# Patient Record
Sex: FEMALE | Race: WHITE
[De-identification: names, ages, dates, MRNs, and addresses within clinical notes are randomized per-mention and may not be internally consistent; named-entity substitution may affect disease eponyms.]

---

## 2017-09-25 ENCOUNTER — HOSPITAL ENCOUNTER (EMERGENCY)
Dept: HOSPITAL 92 - ERS | Age: 47
Discharge: HOME | End: 2017-09-25
Payer: COMMERCIAL

## 2017-09-25 DIAGNOSIS — F17.210: ICD-10-CM

## 2017-09-25 DIAGNOSIS — D72.829: Primary | ICD-10-CM

## 2017-09-25 DIAGNOSIS — J44.9: ICD-10-CM

## 2017-09-25 DIAGNOSIS — F41.9: ICD-10-CM

## 2017-09-25 DIAGNOSIS — F32.9: ICD-10-CM

## 2017-09-25 LAB
ALP SERPL-CCNC: 71 U/L (ref 40–150)
ALT SERPL W P-5'-P-CCNC: 13 U/L (ref 8–55)
ANION GAP SERPL CALC-SCNC: 13 MMOL/L (ref 10–20)
AST SERPL-CCNC: 16 U/L (ref 5–34)
BASOPHILS # BLD AUTO: 0.1 THOU/UL (ref 0–0.2)
BASOPHILS NFR BLD AUTO: 0.7 % (ref 0–1)
BILIRUB SERPL-MCNC: 0.2 MG/DL (ref 0.2–1.2)
BUN SERPL-MCNC: 15 MG/DL (ref 7–18.7)
CALCIUM SERPL-MCNC: 9.7 MG/DL (ref 7.8–10.44)
CHLORIDE SERPL-SCNC: 104 MMOL/L (ref 98–107)
CO2 SERPL-SCNC: 23 MMOL/L (ref 22–29)
CREAT CL PREDICTED SERPL C-G-VRATE: 0 ML/MIN (ref 70–130)
EOSINOPHIL # BLD AUTO: 0.5 THOU/UL (ref 0–0.7)
EOSINOPHIL NFR BLD AUTO: 2.7 % (ref 0–10)
GLOBULIN SER CALC-MCNC: 3.8 G/DL (ref 2.4–3.5)
HCT VFR BLD CALC: 46.2 % (ref 36–47)
LYMPHOCYTES # BLD: 4.3 THOU/UL (ref 1.2–3.4)
LYMPHOCYTES NFR BLD AUTO: 21.9 % (ref 21–51)
MONOCYTES # BLD AUTO: 1.5 THOU/UL (ref 0.11–0.59)
MONOCYTES NFR BLD AUTO: 7.4 % (ref 0–10)
NEUTROPHILS # BLD AUTO: 13.3 THOU/UL (ref 1.4–6.5)
RBC # BLD AUTO: 5 MILL/UL (ref 4.2–5.4)
WBC # BLD AUTO: 19.7 THOU/UL (ref 4.8–10.8)

## 2017-09-25 PROCEDURE — 80053 COMPREHEN METABOLIC PANEL: CPT

## 2017-09-25 PROCEDURE — 81003 URINALYSIS AUTO W/O SCOPE: CPT

## 2017-09-25 PROCEDURE — 96375 TX/PRO/DX INJ NEW DRUG ADDON: CPT

## 2017-09-25 PROCEDURE — 36415 COLL VENOUS BLD VENIPUNCTURE: CPT

## 2017-09-25 PROCEDURE — 96361 HYDRATE IV INFUSION ADD-ON: CPT

## 2017-09-25 PROCEDURE — 74177 CT ABD & PELVIS W/CONTRAST: CPT

## 2017-09-25 PROCEDURE — 96374 THER/PROPH/DIAG INJ IV PUSH: CPT

## 2017-09-25 PROCEDURE — 85025 COMPLETE CBC W/AUTO DIFF WBC: CPT

## 2017-10-16 ENCOUNTER — HOSPITAL ENCOUNTER (EMERGENCY)
Dept: HOSPITAL 92 - ERS | Age: 47
Discharge: HOME | End: 2017-10-16
Payer: COMMERCIAL

## 2017-10-16 DIAGNOSIS — R07.9: ICD-10-CM

## 2017-10-16 DIAGNOSIS — F32.9: ICD-10-CM

## 2017-10-16 DIAGNOSIS — Z79.899: ICD-10-CM

## 2017-10-16 DIAGNOSIS — J38.3: Primary | ICD-10-CM

## 2017-10-16 DIAGNOSIS — F41.9: ICD-10-CM

## 2017-10-16 DIAGNOSIS — J44.9: ICD-10-CM

## 2017-10-16 DIAGNOSIS — F17.210: ICD-10-CM

## 2017-10-16 DIAGNOSIS — F43.9: ICD-10-CM

## 2017-10-16 LAB
ALP SERPL-CCNC: 78 U/L (ref 40–150)
ALT SERPL W P-5'-P-CCNC: 11 U/L (ref 8–55)
ANION GAP SERPL CALC-SCNC: 13 MMOL/L (ref 10–20)
APAP SERPL-MCNC: (no result) MCG/ML (ref 10–30)
AST SERPL-CCNC: 17 U/L (ref 5–34)
BASOPHILS # BLD AUTO: 0.1 THOU/UL (ref 0–0.2)
BASOPHILS NFR BLD AUTO: 1.1 % (ref 0–1)
BILIRUB SERPL-MCNC: 0.7 MG/DL (ref 0.2–1.2)
BUN SERPL-MCNC: 15 MG/DL (ref 7–18.7)
CALCIUM SERPL-MCNC: 9.6 MG/DL (ref 7.8–10.44)
CHLORIDE SERPL-SCNC: 104 MMOL/L (ref 98–107)
CO2 SERPL-SCNC: 26 MMOL/L (ref 22–29)
CREAT CL PREDICTED SERPL C-G-VRATE: 0 ML/MIN (ref 70–130)
EOSINOPHIL # BLD AUTO: 0.6 THOU/UL (ref 0–0.7)
EOSINOPHIL NFR BLD AUTO: 5.5 % (ref 0–10)
GLOBULIN SER CALC-MCNC: 4 G/DL (ref 2.4–3.5)
HCT VFR BLD CALC: 49 % (ref 36–47)
LYMPHOCYTES # BLD: 4.7 THOU/UL (ref 1.2–3.4)
LYMPHOCYTES NFR BLD AUTO: 41.4 % (ref 21–51)
MODIFIED ALLEN'S TEST: POSITIVE
MONOCYTES # BLD AUTO: 1.2 THOU/UL (ref 0.11–0.59)
MONOCYTES NFR BLD AUTO: 10.6 % (ref 0–10)
NEUTROPHILS # BLD AUTO: 4.7 THOU/UL (ref 1.4–6.5)
RBC # BLD AUTO: 5.26 MILL/UL (ref 4.2–5.4)
SALICYLATES SERPL-MCNC: (no result) MG/DL (ref 15–30)
SODIUM SERPL-SCNC: 143 MMOL/L (ref 135–148)
VENT: NO
WBC # BLD AUTO: 11.4 THOU/UL (ref 4.8–10.8)

## 2017-10-16 PROCEDURE — 81003 URINALYSIS AUTO W/O SCOPE: CPT

## 2017-10-16 PROCEDURE — 93005 ELECTROCARDIOGRAM TRACING: CPT

## 2017-10-16 PROCEDURE — 96375 TX/PRO/DX INJ NEW DRUG ADDON: CPT

## 2017-10-16 PROCEDURE — 80306 DRUG TEST PRSMV INSTRMNT: CPT

## 2017-10-16 PROCEDURE — 96361 HYDRATE IV INFUSION ADD-ON: CPT

## 2017-10-16 PROCEDURE — 80053 COMPREHEN METABOLIC PANEL: CPT

## 2017-10-16 PROCEDURE — 94640 AIRWAY INHALATION TREATMENT: CPT

## 2017-10-16 PROCEDURE — 80307 DRUG TEST PRSMV CHEM ANLYZR: CPT

## 2017-10-16 PROCEDURE — 71010: CPT

## 2017-10-16 PROCEDURE — 85025 COMPLETE CBC W/AUTO DIFF WBC: CPT

## 2017-10-16 PROCEDURE — 82805 BLOOD GASES W/O2 SATURATION: CPT

## 2017-10-16 PROCEDURE — 84443 ASSAY THYROID STIM HORMONE: CPT

## 2017-10-16 PROCEDURE — 96374 THER/PROPH/DIAG INJ IV PUSH: CPT

## 2017-10-21 ENCOUNTER — HOSPITAL ENCOUNTER (INPATIENT)
Dept: HOSPITAL 92 - ERS | Age: 47
LOS: 4 days | Discharge: HOME | DRG: 208 | End: 2017-10-25
Attending: INTERNAL MEDICINE | Admitting: INTERNAL MEDICINE
Payer: COMMERCIAL

## 2017-10-21 VITALS — BODY MASS INDEX: 24.1 KG/M2

## 2017-10-21 DIAGNOSIS — F17.210: ICD-10-CM

## 2017-10-21 DIAGNOSIS — J44.1: Primary | ICD-10-CM

## 2017-10-21 DIAGNOSIS — F41.0: ICD-10-CM

## 2017-10-21 DIAGNOSIS — J96.21: ICD-10-CM

## 2017-10-21 DIAGNOSIS — J38.00: ICD-10-CM

## 2017-10-21 DIAGNOSIS — G25.81: ICD-10-CM

## 2017-10-21 DIAGNOSIS — F43.9: ICD-10-CM

## 2017-10-21 DIAGNOSIS — Z88.0: ICD-10-CM

## 2017-10-21 DIAGNOSIS — M79.7: ICD-10-CM

## 2017-10-21 DIAGNOSIS — Z88.1: ICD-10-CM

## 2017-10-21 DIAGNOSIS — J45.901: ICD-10-CM

## 2017-10-21 DIAGNOSIS — F32.9: ICD-10-CM

## 2017-10-21 DIAGNOSIS — F41.9: ICD-10-CM

## 2017-10-21 LAB
ALP SERPL-CCNC: 73 U/L (ref 40–150)
ALT SERPL W P-5'-P-CCNC: 24 U/L (ref 8–55)
ANION GAP SERPL CALC-SCNC: 11 MMOL/L (ref 10–20)
AST SERPL-CCNC: 29 U/L (ref 5–34)
BASOPHILS # BLD AUTO: 0.1 THOU/UL (ref 0–0.2)
BASOPHILS NFR BLD AUTO: 0.9 % (ref 0–1)
BILIRUB SERPL-MCNC: 0.3 MG/DL (ref 0.2–1.2)
BUN SERPL-MCNC: 20 MG/DL (ref 7–18.7)
CALCIUM SERPL-MCNC: 9.1 MG/DL (ref 7.8–10.44)
CHLORIDE SERPL-SCNC: 104 MMOL/L (ref 98–107)
CO2 SERPL-SCNC: 26 MMOL/L (ref 22–29)
CREAT CL PREDICTED SERPL C-G-VRATE: 0 ML/MIN (ref 70–130)
EOSINOPHIL # BLD AUTO: 0.1 THOU/UL (ref 0–0.7)
EOSINOPHIL NFR BLD AUTO: 0.9 % (ref 0–10)
GLOBULIN SER CALC-MCNC: 3.4 G/DL (ref 2.4–3.5)
HCT VFR BLD CALC: 45 % (ref 36–47)
LACTATE SERPL-SCNC: 0.8 MMOL/L (ref 0.5–2.2)
LYMPHOCYTES # BLD: 4.2 THOU/UL (ref 1.2–3.4)
LYMPHOCYTES NFR BLD AUTO: 31 % (ref 21–51)
MAGNESIUM SERPL-MCNC: 1.9 MG/DL (ref 1.6–2.6)
MECHANICAL TIDAL VOLUME: 400 ML
MECHANICAL TIDAL VOLUME: 400 ML
MODIFIED ALLEN'S TEST: (no result)
MODIFIED ALLEN'S TEST: POSITIVE
MONOCYTES # BLD AUTO: 0.9 THOU/UL (ref 0.11–0.59)
MONOCYTES NFR BLD AUTO: 6.7 % (ref 0–10)
NEUTROPHILS # BLD AUTO: 8.1 THOU/UL (ref 1.4–6.5)
RBC # BLD AUTO: 4.83 MILL/UL (ref 4.2–5.4)
SODIUM SERPL-SCNC: 138 MMOL/L (ref 135–148)
SODIUM SERPL-SCNC: 140 MMOL/L (ref 135–148)
TROPONIN I SERPL DL<=0.01 NG/ML-MCNC: (no result) NG/ML (ref ?–0.03)
VENT: YES
VENT: YES
WBC # BLD AUTO: 13.4 THOU/UL (ref 4.8–10.8)

## 2017-10-21 PROCEDURE — 83735 ASSAY OF MAGNESIUM: CPT

## 2017-10-21 PROCEDURE — 85027 COMPLETE CBC AUTOMATED: CPT

## 2017-10-21 PROCEDURE — 85379 FIBRIN DEGRADATION QUANT: CPT

## 2017-10-21 PROCEDURE — 81003 URINALYSIS AUTO W/O SCOPE: CPT

## 2017-10-21 PROCEDURE — 85025 COMPLETE CBC W/AUTO DIFF WBC: CPT

## 2017-10-21 PROCEDURE — 94727 GAS DIL/WSHOT DETER LNG VOL: CPT

## 2017-10-21 PROCEDURE — 80053 COMPREHEN METABOLIC PANEL: CPT

## 2017-10-21 PROCEDURE — 85007 BL SMEAR W/DIFF WBC COUNT: CPT

## 2017-10-21 PROCEDURE — 83605 ASSAY OF LACTIC ACID: CPT

## 2017-10-21 PROCEDURE — 5A1935Z RESPIRATORY VENTILATION, LESS THAN 24 CONSECUTIVE HOURS: ICD-10-PCS | Performed by: INTERNAL MEDICINE

## 2017-10-21 PROCEDURE — 94640 AIRWAY INHALATION TREATMENT: CPT

## 2017-10-21 PROCEDURE — 82553 CREATINE MB FRACTION: CPT

## 2017-10-21 PROCEDURE — 99292 CRITICAL CARE ADDL 30 MIN: CPT

## 2017-10-21 PROCEDURE — 71010: CPT

## 2017-10-21 PROCEDURE — 94060 EVALUATION OF WHEEZING: CPT

## 2017-10-21 PROCEDURE — 80048 BASIC METABOLIC PNL TOTAL CA: CPT

## 2017-10-21 PROCEDURE — 84484 ASSAY OF TROPONIN QUANT: CPT

## 2017-10-21 PROCEDURE — S0028 INJECTION, FAMOTIDINE, 20 MG: HCPCS

## 2017-10-21 PROCEDURE — 51702 INSERT TEMP BLADDER CATH: CPT

## 2017-10-21 PROCEDURE — 84702 CHORIONIC GONADOTROPIN TEST: CPT

## 2017-10-21 PROCEDURE — 94002 VENT MGMT INPAT INIT DAY: CPT

## 2017-10-21 PROCEDURE — 96375 TX/PRO/DX INJ NEW DRUG ADDON: CPT

## 2017-10-21 PROCEDURE — 36415 COLL VENOUS BLD VENIPUNCTURE: CPT

## 2017-10-21 PROCEDURE — 70450 CT HEAD/BRAIN W/O DYE: CPT

## 2017-10-21 PROCEDURE — 93005 ELECTROCARDIOGRAM TRACING: CPT

## 2017-10-21 PROCEDURE — 96365 THER/PROPH/DIAG IV INF INIT: CPT

## 2017-10-21 PROCEDURE — A4216 STERILE WATER/SALINE, 10 ML: HCPCS

## 2017-10-21 PROCEDURE — 72125 CT NECK SPINE W/O DYE: CPT

## 2017-10-21 PROCEDURE — 82805 BLOOD GASES W/O2 SATURATION: CPT

## 2017-10-21 RX ADMIN — FAMOTIDINE SCH MG: 10 INJECTION, SOLUTION INTRAVENOUS at 20:04

## 2017-10-21 RX ADMIN — FAMOTIDINE SCH MG: 10 INJECTION, SOLUTION INTRAVENOUS at 08:44

## 2017-10-22 LAB
ANION GAP SERPL CALC-SCNC: 11 MMOL/L (ref 10–20)
BUN SERPL-MCNC: 13 MG/DL (ref 7–18.7)
CALCIUM SERPL-MCNC: 9.4 MG/DL (ref 7.8–10.44)
CHLORIDE SERPL-SCNC: 102 MMOL/L (ref 98–107)
CO2 SERPL-SCNC: 29 MMOL/L (ref 22–29)
CREAT CL PREDICTED SERPL C-G-VRATE: 105 ML/MIN (ref 70–130)
HCT VFR BLD CALC: 39 % (ref 36–47)
RBC # BLD AUTO: 4.13 MILL/UL (ref 4.2–5.4)
WBC # BLD AUTO: 15.9 THOU/UL (ref 4.8–10.8)

## 2017-10-22 RX ADMIN — FAMOTIDINE SCH MG: 10 INJECTION, SOLUTION INTRAVENOUS at 08:39

## 2017-10-23 LAB
ANION GAP SERPL CALC-SCNC: 8 MMOL/L (ref 10–20)
BUN SERPL-MCNC: 19 MG/DL (ref 7–18.7)
CALCIUM SERPL-MCNC: 8.9 MG/DL (ref 7.8–10.44)
CHLORIDE SERPL-SCNC: 103 MMOL/L (ref 98–107)
CO2 SERPL-SCNC: 32 MMOL/L (ref 22–29)
CREAT CL PREDICTED SERPL C-G-VRATE: 101 ML/MIN (ref 70–130)
HCT VFR BLD CALC: 40.5 % (ref 36–47)
RBC # BLD AUTO: 4.29 MILL/UL (ref 4.2–5.4)
WBC # BLD AUTO: 15.5 THOU/UL (ref 4.8–10.8)

## 2017-10-23 RX ADMIN — Medication SCH ML: at 21:03

## 2017-10-24 LAB
ANION GAP SERPL CALC-SCNC: 11 MMOL/L (ref 10–20)
BUN SERPL-MCNC: 15 MG/DL (ref 7–18.7)
CALCIUM SERPL-MCNC: 8.6 MG/DL (ref 7.8–10.44)
CHLORIDE SERPL-SCNC: 103 MMOL/L (ref 98–107)
CO2 SERPL-SCNC: 27 MMOL/L (ref 22–29)
CREAT CL PREDICTED SERPL C-G-VRATE: 117 ML/MIN (ref 70–130)
HCT VFR BLD CALC: 42.2 % (ref 36–47)
RBC # BLD AUTO: 4.58 MILL/UL (ref 4.2–5.4)
WBC # BLD AUTO: 16.2 THOU/UL (ref 4.8–10.8)

## 2017-10-24 RX ADMIN — Medication SCH ML: at 08:47

## 2017-10-24 RX ADMIN — Medication SCH: at 21:45

## 2017-10-24 RX ADMIN — DOCUSATE SODIUM 50 MG AND SENNOSIDES 8.6 MG SCH: 8.6; 5 TABLET, FILM COATED ORAL at 21:19

## 2017-10-24 RX ADMIN — Medication SCH ML: at 21:46

## 2017-10-25 VITALS — DIASTOLIC BLOOD PRESSURE: 72 MMHG | TEMPERATURE: 98.6 F | SYSTOLIC BLOOD PRESSURE: 129 MMHG

## 2017-10-25 RX ADMIN — Medication SCH: at 09:10

## 2017-10-25 RX ADMIN — DOCUSATE SODIUM 50 MG AND SENNOSIDES 8.6 MG SCH: 8.6; 5 TABLET, FILM COATED ORAL at 09:10

## 2017-12-05 ENCOUNTER — HOSPITAL ENCOUNTER (OUTPATIENT)
Dept: HOSPITAL 92 - CT | Age: 47
Discharge: HOME | End: 2017-12-05
Attending: INTERNAL MEDICINE
Payer: COMMERCIAL

## 2017-12-05 DIAGNOSIS — R91.1: Primary | ICD-10-CM

## 2017-12-05 PROCEDURE — 71260 CT THORAX DX C+: CPT

## 2017-12-13 ENCOUNTER — HOSPITAL ENCOUNTER (OUTPATIENT)
Dept: HOSPITAL 92 - SCSER | Age: 47
Setting detail: OBSERVATION
Discharge: LEFT BEFORE DISCHARGE | End: 2017-12-13
Attending: FAMILY MEDICINE | Admitting: FAMILY MEDICINE
Payer: COMMERCIAL

## 2017-12-13 VITALS — DIASTOLIC BLOOD PRESSURE: 75 MMHG | SYSTOLIC BLOOD PRESSURE: 121 MMHG | TEMPERATURE: 99.2 F

## 2017-12-13 VITALS — BODY MASS INDEX: 25.5 KG/M2

## 2017-12-13 DIAGNOSIS — G24.09: Primary | ICD-10-CM

## 2017-12-13 DIAGNOSIS — T43.595A: ICD-10-CM

## 2017-12-13 DIAGNOSIS — G25.81: ICD-10-CM

## 2017-12-13 DIAGNOSIS — F17.210: ICD-10-CM

## 2017-12-13 DIAGNOSIS — M62.838: ICD-10-CM

## 2017-12-13 DIAGNOSIS — J44.9: ICD-10-CM

## 2017-12-13 DIAGNOSIS — Z88.8: ICD-10-CM

## 2017-12-13 DIAGNOSIS — Z79.51: ICD-10-CM

## 2017-12-13 DIAGNOSIS — M54.12: ICD-10-CM

## 2017-12-13 DIAGNOSIS — Z90.49: ICD-10-CM

## 2017-12-13 DIAGNOSIS — J45.909: ICD-10-CM

## 2017-12-13 DIAGNOSIS — Z88.0: ICD-10-CM

## 2017-12-13 DIAGNOSIS — Z88.1: ICD-10-CM

## 2017-12-13 DIAGNOSIS — Z79.890: ICD-10-CM

## 2017-12-13 DIAGNOSIS — Z88.5: ICD-10-CM

## 2017-12-13 DIAGNOSIS — Z79.899: ICD-10-CM

## 2017-12-13 DIAGNOSIS — F41.8: ICD-10-CM

## 2017-12-13 DIAGNOSIS — Z98.890: ICD-10-CM

## 2017-12-13 DIAGNOSIS — Z90.710: ICD-10-CM

## 2017-12-13 DIAGNOSIS — M79.7: ICD-10-CM

## 2017-12-13 DIAGNOSIS — Z90.89: ICD-10-CM

## 2017-12-13 DIAGNOSIS — D72.829: ICD-10-CM

## 2017-12-13 LAB
ALP SERPL-CCNC: 63 U/L (ref 40–150)
ALT SERPL W P-5'-P-CCNC: 14 U/L (ref 8–55)
ANION GAP SERPL CALC-SCNC: 15 MMOL/L (ref 10–20)
AST SERPL-CCNC: 18 U/L (ref 5–34)
BASOPHILS # BLD AUTO: 0.2 THOU/UL (ref 0–0.2)
BASOPHILS NFR BLD AUTO: 1.5 % (ref 0–1)
BILIRUB SERPL-MCNC: 0.3 MG/DL (ref 0.2–1.2)
BUN SERPL-MCNC: 15 MG/DL (ref 7–18.7)
CALCIUM SERPL-MCNC: 9.7 MG/DL (ref 7.8–10.44)
CHLORIDE SERPL-SCNC: 106 MMOL/L (ref 98–107)
CO2 SERPL-SCNC: 23 MMOL/L (ref 22–29)
CREAT CL PREDICTED SERPL C-G-VRATE: 0 ML/MIN (ref 70–130)
EOSINOPHIL # BLD AUTO: 0.4 THOU/UL (ref 0–0.7)
EOSINOPHIL NFR BLD AUTO: 3.1 % (ref 0–10)
GLOBULIN SER CALC-MCNC: 3.5 G/DL (ref 2.4–3.5)
HCT VFR BLD CALC: 48.2 % (ref 36–47)
LYMPHOCYTES # BLD: 2.8 THOU/UL (ref 1.2–3.4)
LYMPHOCYTES NFR BLD AUTO: 23.8 % (ref 21–51)
MONOCYTES # BLD AUTO: 1 THOU/UL (ref 0.11–0.59)
MONOCYTES NFR BLD AUTO: 8.9 % (ref 0–10)
NEUTROPHILS # BLD AUTO: 7.3 THOU/UL (ref 1.4–6.5)
RBC # BLD AUTO: 5.32 MILL/UL (ref 4.2–5.4)
TROPONIN I SERPL DL<=0.01 NG/ML-MCNC: (no result) NG/ML (ref ?–0.03)
TROPONIN I SERPL DL<=0.01 NG/ML-MCNC: (no result) NG/ML (ref ?–0.03)
TROPONIN I SERPL DL<=0.01 NG/ML-MCNC: 0.01 NG/ML (ref ?–0.03)
WBC # BLD AUTO: 11.6 THOU/UL (ref 4.8–10.8)

## 2017-12-13 PROCEDURE — 80053 COMPREHEN METABOLIC PANEL: CPT

## 2017-12-13 PROCEDURE — 82553 CREATINE MB FRACTION: CPT

## 2017-12-13 PROCEDURE — 94640 AIRWAY INHALATION TREATMENT: CPT

## 2017-12-13 PROCEDURE — 81003 URINALYSIS AUTO W/O SCOPE: CPT

## 2017-12-13 PROCEDURE — G0378 HOSPITAL OBSERVATION PER HR: HCPCS

## 2017-12-13 PROCEDURE — 80306 DRUG TEST PRSMV INSTRMNT: CPT

## 2017-12-13 PROCEDURE — 96374 THER/PROPH/DIAG INJ IV PUSH: CPT

## 2017-12-13 PROCEDURE — 70450 CT HEAD/BRAIN W/O DYE: CPT

## 2017-12-13 PROCEDURE — 93005 ELECTROCARDIOGRAM TRACING: CPT

## 2017-12-13 PROCEDURE — 36415 COLL VENOUS BLD VENIPUNCTURE: CPT

## 2017-12-13 PROCEDURE — 85025 COMPLETE CBC W/AUTO DIFF WBC: CPT

## 2017-12-13 PROCEDURE — 84484 ASSAY OF TROPONIN QUANT: CPT

## 2017-12-22 ENCOUNTER — HOSPITAL ENCOUNTER (OUTPATIENT)
Dept: HOSPITAL 92 - ERS | Age: 47
Setting detail: OBSERVATION
LOS: 2 days | Discharge: HOME | End: 2017-12-24
Attending: INTERNAL MEDICINE | Admitting: INTERNAL MEDICINE
Payer: COMMERCIAL

## 2017-12-22 DIAGNOSIS — R06.03: ICD-10-CM

## 2017-12-22 DIAGNOSIS — J44.1: Primary | ICD-10-CM

## 2017-12-22 DIAGNOSIS — Z88.1: ICD-10-CM

## 2017-12-22 DIAGNOSIS — G25.81: ICD-10-CM

## 2017-12-22 DIAGNOSIS — Z88.0: ICD-10-CM

## 2017-12-22 DIAGNOSIS — Z88.8: ICD-10-CM

## 2017-12-22 DIAGNOSIS — F32.9: ICD-10-CM

## 2017-12-22 DIAGNOSIS — Z88.5: ICD-10-CM

## 2017-12-22 DIAGNOSIS — J45.909: ICD-10-CM

## 2017-12-22 DIAGNOSIS — M79.7: ICD-10-CM

## 2017-12-22 DIAGNOSIS — F41.9: ICD-10-CM

## 2017-12-22 DIAGNOSIS — F17.200: ICD-10-CM

## 2017-12-22 LAB
ALP SERPL-CCNC: 65 U/L (ref 40–150)
ALT SERPL W P-5'-P-CCNC: 15 U/L (ref 8–55)
ANION GAP SERPL CALC-SCNC: 14 MMOL/L (ref 10–20)
AST SERPL-CCNC: 18 U/L (ref 5–34)
BASOPHILS # BLD AUTO: 0.1 THOU/UL (ref 0–0.2)
BASOPHILS NFR BLD AUTO: 0.8 % (ref 0–1)
BILIRUB SERPL-MCNC: 0.2 MG/DL (ref 0.2–1.2)
BUN SERPL-MCNC: 19 MG/DL (ref 7–18.7)
CALCIUM SERPL-MCNC: 9.7 MG/DL (ref 7.8–10.44)
CHLORIDE SERPL-SCNC: 104 MMOL/L (ref 98–107)
CK SERPL-CCNC: 165 U/L (ref 29–168)
CO2 SERPL-SCNC: 26 MMOL/L (ref 22–29)
CREAT CL PREDICTED SERPL C-G-VRATE: 0 ML/MIN (ref 70–130)
EOSINOPHIL # BLD AUTO: 0.1 THOU/UL (ref 0–0.7)
EOSINOPHIL NFR BLD AUTO: 0.8 % (ref 0–10)
GLOBULIN SER CALC-MCNC: 3.4 G/DL (ref 2.4–3.5)
HCT VFR BLD CALC: 42.1 % (ref 36–47)
LYMPHOCYTES # BLD: 3.4 THOU/UL (ref 1.2–3.4)
LYMPHOCYTES NFR BLD AUTO: 22.2 % (ref 21–51)
MODIFIED ALLEN'S TEST: POSITIVE
MONOCYTES # BLD AUTO: 1.2 THOU/UL (ref 0.11–0.59)
MONOCYTES NFR BLD AUTO: 7.6 % (ref 0–10)
NEUTROPHILS # BLD AUTO: 10.5 THOU/UL (ref 1.4–6.5)
RBC # BLD AUTO: 4.6 MILL/UL (ref 4.2–5.4)
SODIUM SERPL-SCNC: 140 MMOL/L (ref 135–148)
TROPONIN I SERPL DL<=0.01 NG/ML-MCNC: (no result) NG/ML (ref ?–0.03)
VENT: NO
WBC # BLD AUTO: 15.3 THOU/UL (ref 4.8–10.8)

## 2017-12-22 PROCEDURE — 94640 AIRWAY INHALATION TREATMENT: CPT

## 2017-12-22 PROCEDURE — 96366 THER/PROPH/DIAG IV INF ADDON: CPT

## 2017-12-22 PROCEDURE — 87633 RESP VIRUS 12-25 TARGETS: CPT

## 2017-12-22 PROCEDURE — 82805 BLOOD GASES W/O2 SATURATION: CPT

## 2017-12-22 PROCEDURE — 99406 BEHAV CHNG SMOKING 3-10 MIN: CPT

## 2017-12-22 PROCEDURE — 83880 ASSAY OF NATRIURETIC PEPTIDE: CPT

## 2017-12-22 PROCEDURE — 80053 COMPREHEN METABOLIC PANEL: CPT

## 2017-12-22 PROCEDURE — 96367 TX/PROPH/DG ADDL SEQ IV INF: CPT

## 2017-12-22 PROCEDURE — 85025 COMPLETE CBC W/AUTO DIFF WBC: CPT

## 2017-12-22 PROCEDURE — 71010: CPT

## 2017-12-22 PROCEDURE — 93005 ELECTROCARDIOGRAM TRACING: CPT

## 2017-12-22 PROCEDURE — 87798 DETECT AGENT NOS DNA AMP: CPT

## 2017-12-22 PROCEDURE — G0378 HOSPITAL OBSERVATION PER HR: HCPCS

## 2017-12-22 PROCEDURE — 82553 CREATINE MB FRACTION: CPT

## 2017-12-22 PROCEDURE — 96375 TX/PRO/DX INJ NEW DRUG ADDON: CPT

## 2017-12-22 PROCEDURE — 96365 THER/PROPH/DIAG IV INF INIT: CPT

## 2017-12-22 PROCEDURE — 84484 ASSAY OF TROPONIN QUANT: CPT

## 2017-12-22 PROCEDURE — A4216 STERILE WATER/SALINE, 10 ML: HCPCS

## 2017-12-22 PROCEDURE — 96376 TX/PRO/DX INJ SAME DRUG ADON: CPT

## 2017-12-23 RX ADMIN — Medication SCH ML: at 20:21

## 2017-12-24 VITALS — TEMPERATURE: 98.4 F | DIASTOLIC BLOOD PRESSURE: 69 MMHG | SYSTOLIC BLOOD PRESSURE: 129 MMHG

## 2017-12-24 RX ADMIN — Medication SCH ML: at 09:02

## 2018-01-15 ENCOUNTER — HOSPITAL ENCOUNTER (INPATIENT)
Dept: HOSPITAL 92 - ERS | Age: 48
LOS: 4 days | Discharge: HOME | DRG: 208 | End: 2018-01-19
Attending: INTERNAL MEDICINE | Admitting: INTERNAL MEDICINE
Payer: COMMERCIAL

## 2018-01-15 VITALS — BODY MASS INDEX: 26.4 KG/M2

## 2018-01-15 DIAGNOSIS — J44.1: ICD-10-CM

## 2018-01-15 DIAGNOSIS — F15.10: ICD-10-CM

## 2018-01-15 DIAGNOSIS — Z88.1: ICD-10-CM

## 2018-01-15 DIAGNOSIS — J96.02: ICD-10-CM

## 2018-01-15 DIAGNOSIS — Z88.8: ICD-10-CM

## 2018-01-15 DIAGNOSIS — M79.7: ICD-10-CM

## 2018-01-15 DIAGNOSIS — J38.3: ICD-10-CM

## 2018-01-15 DIAGNOSIS — J45.901: ICD-10-CM

## 2018-01-15 DIAGNOSIS — Z88.5: ICD-10-CM

## 2018-01-15 DIAGNOSIS — F17.210: ICD-10-CM

## 2018-01-15 DIAGNOSIS — J96.01: Primary | ICD-10-CM

## 2018-01-15 DIAGNOSIS — Z88.0: ICD-10-CM

## 2018-01-15 LAB
ANALYZER IN CARDIO: (no result)
ANION GAP SERPL CALC-SCNC: 15 MMOL/L (ref 10–20)
APTT PPP: 31.2 SEC (ref 22.9–36.1)
BASE EXCESS STD BLDA CALC-SCNC: -2.3 MEQ/L
BASOPHILS # BLD AUTO: 0.1 THOU/UL (ref 0–0.2)
BASOPHILS NFR BLD AUTO: 0.7 % (ref 0–1)
BUN SERPL-MCNC: 17 MG/DL (ref 7–18.7)
CA-I BLDA-SCNC: 1.2 MMOL/L (ref 1.12–1.3)
CALCIUM SERPL-MCNC: 9.8 MG/DL (ref 7.8–10.44)
CHLORIDE SERPL-SCNC: 104 MMOL/L (ref 98–107)
CK MB SERPL-MCNC: 2.5 NG/ML (ref 0–6.6)
CK SERPL-CCNC: 172 U/L (ref 29–168)
CO2 SERPL-SCNC: 24 MMOL/L (ref 22–29)
CREAT CL PREDICTED SERPL C-G-VRATE: 0 ML/MIN (ref 70–130)
CRYSTAL-AUWI FLAG: 0.4 (ref 0–15)
DRUG SCREEN CUTOFF: (no result)
EOSINOPHIL # BLD AUTO: 0.4 THOU/UL (ref 0–0.7)
EOSINOPHIL NFR BLD AUTO: 3 % (ref 0–10)
GLUCOSE SERPL-MCNC: 84 MG/DL (ref 70–105)
HCO3 BLDA-SCNC: 24.8 MEQ/L (ref 22–26)
HCT VFR BLDA CALC: 44 % (ref 36–47)
HEV IGM SER QL: 1.8 (ref 0–7.99)
HGB BLD-MCNC: 15.5 G/DL (ref 12–16)
HGB BLDA-MCNC: 14 G/DL (ref 12–16)
HYALINE CASTS #/AREA URNS LPF: (no result) LPF
INR PPP: 1
LIPASE SERPL-CCNC: 91 U/L (ref 8–78)
LYMPHOCYTES # BLD: 3.8 THOU/UL (ref 1.2–3.4)
LYMPHOCYTES NFR BLD AUTO: 27.9 % (ref 21–51)
MAGNESIUM SERPL-MCNC: 2.5 MG/DL (ref 1.6–2.6)
MCH RBC QN AUTO: 30.1 PG (ref 27–31)
MCV RBC AUTO: 92.4 FL (ref 81–99)
MEDTOX CONTROL LINE VALID?: (no result)
MEDTOX READER #: (no result)
MONOCYTES # BLD AUTO: 1 THOU/UL (ref 0.11–0.59)
MONOCYTES NFR BLD AUTO: 7.6 % (ref 0–10)
NEUTROPHILS # BLD AUTO: 8.2 THOU/UL (ref 1.4–6.5)
NEUTROPHILS NFR BLD AUTO: 60.8 % (ref 42–75)
O2 A-A PPRESDIFF RESPIRATORY: 166.03 MM[HG] (ref 0–20)
PATHC CAST-AUWI FLAG: 1.49 (ref 0–2.49)
PCO2 BLDA: 51.9 MMHG (ref 35–45)
PH BLDA: 7.3 [PH] (ref 7.35–7.45)
PLATELET # BLD AUTO: 256 THOU/UL (ref 130–400)
PO2 BLDA: 123.1 MMHG (ref 80–100)
POTASSIUM SERPL-SCNC: 4.5 MMOL/L (ref 3.5–5.1)
PREGS CONTROL BACKGROUND?: (no result)
PREGS CONTROL BAR APPEAR?: YES
PROT UR STRIP.AUTO-MCNC: 30 MG/DL
PROTHROMBIN TIME: 13 SEC (ref 12–14.7)
RBC # BLD AUTO: 5.14 MILL/UL (ref 4.2–5.4)
RBC UR QL AUTO: (no result) HPF (ref 0–3)
SODIUM SERPL-SCNC: 138 MMOL/L (ref 136–145)
SP GR UR STRIP: 1.02 (ref 1–1.04)
SPECIMEN DRAWN FROM PATIENT: (no result)
SPERM-AUWI FLAG: 0 (ref 0–9.9)
TROPONIN I SERPL DL<=0.01 NG/ML-MCNC: 0.02 NG/ML (ref ?–0.03)
WBC # BLD AUTO: 13.5 THOU/UL (ref 4.8–10.8)
WBC UR QL AUTO: (no result) HPF (ref 0–3)
YEAST-AUWI FLAG: 0 (ref 0–25)

## 2018-01-15 PROCEDURE — 82550 ASSAY OF CK (CPK): CPT

## 2018-01-15 PROCEDURE — 87186 SC STD MICRODIL/AGAR DIL: CPT

## 2018-01-15 PROCEDURE — 85730 THROMBOPLASTIN TIME PARTIAL: CPT

## 2018-01-15 PROCEDURE — 85025 COMPLETE CBC W/AUTO DIFF WBC: CPT

## 2018-01-15 PROCEDURE — 80048 BASIC METABOLIC PNL TOTAL CA: CPT

## 2018-01-15 PROCEDURE — 96375 TX/PRO/DX INJ NEW DRUG ADDON: CPT

## 2018-01-15 PROCEDURE — 96365 THER/PROPH/DIAG IV INF INIT: CPT

## 2018-01-15 PROCEDURE — S0028 INJECTION, FAMOTIDINE, 20 MG: HCPCS

## 2018-01-15 PROCEDURE — A4216 STERILE WATER/SALINE, 10 ML: HCPCS

## 2018-01-15 PROCEDURE — 51702 INSERT TEMP BLADDER CATH: CPT

## 2018-01-15 PROCEDURE — 94002 VENT MGMT INPAT INIT DAY: CPT

## 2018-01-15 PROCEDURE — 94640 AIRWAY INHALATION TREATMENT: CPT

## 2018-01-15 PROCEDURE — 81003 URINALYSIS AUTO W/O SCOPE: CPT

## 2018-01-15 PROCEDURE — 84703 CHORIONIC GONADOTROPIN ASSAY: CPT

## 2018-01-15 PROCEDURE — 83690 ASSAY OF LIPASE: CPT

## 2018-01-15 PROCEDURE — 96376 TX/PRO/DX INJ SAME DRUG ADON: CPT

## 2018-01-15 PROCEDURE — 31500 INSERT EMERGENCY AIRWAY: CPT

## 2018-01-15 PROCEDURE — 87205 SMEAR GRAM STAIN: CPT

## 2018-01-15 PROCEDURE — 80306 DRUG TEST PRSMV INSTRMNT: CPT

## 2018-01-15 PROCEDURE — 84484 ASSAY OF TROPONIN QUANT: CPT

## 2018-01-15 PROCEDURE — 96366 THER/PROPH/DIAG IV INF ADDON: CPT

## 2018-01-15 PROCEDURE — 82805 BLOOD GASES W/O2 SATURATION: CPT

## 2018-01-15 PROCEDURE — 87070 CULTURE OTHR SPECIMN AEROBIC: CPT

## 2018-01-15 PROCEDURE — 71045 X-RAY EXAM CHEST 1 VIEW: CPT

## 2018-01-15 PROCEDURE — 82553 CREATINE MB FRACTION: CPT

## 2018-01-15 PROCEDURE — 94003 VENT MGMT INPAT SUBQ DAY: CPT

## 2018-01-15 PROCEDURE — 36415 COLL VENOUS BLD VENIPUNCTURE: CPT

## 2018-01-15 PROCEDURE — 93005 ELECTROCARDIOGRAM TRACING: CPT

## 2018-01-15 PROCEDURE — 0BH17EZ INSERTION OF ENDOTRACHEAL AIRWAY INTO TRACHEA, VIA NATURAL OR ARTIFICIAL OPENING: ICD-10-PCS | Performed by: INTERNAL MEDICINE

## 2018-01-15 PROCEDURE — 87077 CULTURE AEROBIC IDENTIFY: CPT

## 2018-01-15 PROCEDURE — 85610 PROTHROMBIN TIME: CPT

## 2018-01-15 PROCEDURE — 81015 MICROSCOPIC EXAM OF URINE: CPT

## 2018-01-15 PROCEDURE — 87040 BLOOD CULTURE FOR BACTERIA: CPT

## 2018-01-15 PROCEDURE — 5A1935Z RESPIRATORY VENTILATION, LESS THAN 24 CONSECUTIVE HOURS: ICD-10-PCS | Performed by: INTERNAL MEDICINE

## 2018-01-15 PROCEDURE — 83880 ASSAY OF NATRIURETIC PEPTIDE: CPT

## 2018-01-15 PROCEDURE — 83735 ASSAY OF MAGNESIUM: CPT

## 2018-01-15 RX ADMIN — Medication SCH ML: at 21:25

## 2018-01-15 RX ADMIN — FAMOTIDINE SCH MG: 10 INJECTION, SOLUTION INTRAVENOUS at 21:20

## 2018-01-16 LAB
ANALYZER IN CARDIO: (no result)
ANION GAP SERPL CALC-SCNC: 12 MMOL/L (ref 10–20)
BASE EXCESS STD BLDA CALC-SCNC: -0.3 MEQ/L
BASOPHILS # BLD AUTO: 0 THOU/UL (ref 0–0.2)
BASOPHILS NFR BLD AUTO: 0.1 % (ref 0–1)
BUN SERPL-MCNC: 11 MG/DL (ref 7–18.7)
CA-I BLDA-SCNC: 1.2 MMOL/L (ref 1.12–1.3)
CALCIUM SERPL-MCNC: 8.4 MG/DL (ref 7.8–10.44)
CHLORIDE SERPL-SCNC: 109 MMOL/L (ref 98–107)
CO2 SERPL-SCNC: 23 MMOL/L (ref 22–29)
CREAT CL PREDICTED SERPL C-G-VRATE: 124 ML/MIN (ref 70–130)
EOSINOPHIL # BLD AUTO: 0 THOU/UL (ref 0–0.7)
EOSINOPHIL NFR BLD AUTO: 0.1 % (ref 0–10)
GLUCOSE SERPL-MCNC: 140 MG/DL (ref 70–105)
HCO3 BLDA-SCNC: 23.7 MEQ/L (ref 22–26)
HCT VFR BLDA CALC: 40.1 % (ref 36–47)
HGB BLD-MCNC: 13.4 G/DL (ref 12–16)
HGB BLDA-MCNC: 13.5 G/DL (ref 12–16)
LYMPHOCYTES # BLD: 1.1 THOU/UL (ref 1.2–3.4)
LYMPHOCYTES NFR BLD AUTO: 10.7 % (ref 21–51)
MCH RBC QN AUTO: 30.5 PG (ref 27–31)
MCV RBC AUTO: 91.9 FL (ref 81–99)
MONOCYTES # BLD AUTO: 0.1 THOU/UL (ref 0.11–0.59)
MONOCYTES NFR BLD AUTO: 1.2 % (ref 0–10)
NEUTROPHILS # BLD AUTO: 8.9 THOU/UL (ref 1.4–6.5)
NEUTROPHILS NFR BLD AUTO: 87.9 % (ref 42–75)
O2 A-A PPRESDIFF RESPIRATORY: 127.78 MM[HG] (ref 0–20)
PCO2 BLDA: 36.9 MMHG (ref 35–45)
PH BLDA: 7.43 [PH] (ref 7.35–7.45)
PLATELET # BLD AUTO: 210 THOU/UL (ref 130–400)
PO2 BLDA: 113.3 MMHG (ref 80–100)
POTASSIUM SERPL-SCNC: 3.8 MMOL/L (ref 3.5–5.1)
RBC # BLD AUTO: 4.39 MILL/UL (ref 4.2–5.4)
SODIUM SERPL-SCNC: 140 MMOL/L (ref 136–145)
SPECIMEN DRAWN FROM PATIENT: (no result)
WBC # BLD AUTO: 10.1 THOU/UL (ref 4.8–10.8)

## 2018-01-16 PROCEDURE — 0BP1XDZ REMOVAL OF INTRALUMINAL DEVICE FROM TRACHEA, EXTERNAL APPROACH: ICD-10-PCS | Performed by: INTERNAL MEDICINE

## 2018-01-16 RX ADMIN — FAMOTIDINE SCH MG: 10 INJECTION, SOLUTION INTRAVENOUS at 08:01

## 2018-01-16 RX ADMIN — Medication PRN ML: at 11:08

## 2018-01-16 RX ADMIN — AZITHROMYCIN SCH MLS: 500 INJECTION, POWDER, LYOPHILIZED, FOR SOLUTION INTRAVENOUS at 09:10

## 2018-01-16 RX ADMIN — FAMOTIDINE SCH MG: 10 INJECTION, SOLUTION INTRAVENOUS at 20:53

## 2018-01-16 RX ADMIN — Medication SCH ML: at 08:01

## 2018-01-16 RX ADMIN — Medication SCH ML: at 20:54

## 2018-01-17 LAB
ANALYZER IN CARDIO: (no result)
ANION GAP SERPL CALC-SCNC: 10 MMOL/L (ref 10–20)
BASE EXCESS STD BLDA CALC-SCNC: -0.6 MEQ/L
BASOPHILS # BLD AUTO: 0 THOU/UL (ref 0–0.2)
BASOPHILS NFR BLD AUTO: 0.1 % (ref 0–1)
BUN SERPL-MCNC: 18 MG/DL (ref 7–18.7)
CA-I BLDA-SCNC: 1.2 MMOL/L (ref 1.12–1.3)
CALCIUM SERPL-MCNC: 8.4 MG/DL (ref 7.8–10.44)
CHLORIDE SERPL-SCNC: 112 MMOL/L (ref 98–107)
CO2 SERPL-SCNC: 24 MMOL/L (ref 22–29)
CREAT CL PREDICTED SERPL C-G-VRATE: 124 ML/MIN (ref 70–130)
EOSINOPHIL # BLD AUTO: 0 THOU/UL (ref 0–0.7)
EOSINOPHIL NFR BLD AUTO: 0.1 % (ref 0–10)
GLUCOSE SERPL-MCNC: 128 MG/DL (ref 70–105)
HCO3 BLDA-SCNC: 24.1 MEQ/L (ref 22–26)
HCT VFR BLDA CALC: 35.8 % (ref 36–47)
HGB BLD-MCNC: 12.2 G/DL (ref 12–16)
HGB BLDA-MCNC: 11.6 G/DL (ref 12–16)
LYMPHOCYTES # BLD: 0.9 THOU/UL (ref 1.2–3.4)
LYMPHOCYTES NFR BLD AUTO: 6.1 % (ref 21–51)
MCH RBC QN AUTO: 30.8 PG (ref 27–31)
MCV RBC AUTO: 92.7 FL (ref 81–99)
MONOCYTES # BLD AUTO: 0.7 THOU/UL (ref 0.11–0.59)
MONOCYTES NFR BLD AUTO: 5 % (ref 0–10)
NEUTROPHILS # BLD AUTO: 12.8 THOU/UL (ref 1.4–6.5)
NEUTROPHILS NFR BLD AUTO: 88.6 % (ref 42–75)
O2 A-A PPRESDIFF RESPIRATORY: 93.4 MM[HG] (ref 0–20)
PCO2 BLDA: 40 MMHG (ref 35–45)
PH BLDA: 7.4 [PH] (ref 7.35–7.45)
PLATELET # BLD AUTO: 208 THOU/UL (ref 130–400)
PO2 BLDA: 104.4 MMHG (ref 80–100)
POTASSIUM SERPL-SCNC: 4 MMOL/L (ref 3.5–5.1)
RBC # BLD AUTO: 3.95 MILL/UL (ref 4.2–5.4)
SODIUM SERPL-SCNC: 142 MMOL/L (ref 136–145)
SPECIMEN DRAWN FROM PATIENT: (no result)
WBC # BLD AUTO: 14.4 THOU/UL (ref 4.8–10.8)

## 2018-01-17 RX ADMIN — AZITHROMYCIN SCH MLS: 500 INJECTION, POWDER, LYOPHILIZED, FOR SOLUTION INTRAVENOUS at 08:33

## 2018-01-17 RX ADMIN — Medication SCH ML: at 21:29

## 2018-01-17 RX ADMIN — FAMOTIDINE SCH MG: 10 INJECTION, SOLUTION INTRAVENOUS at 21:17

## 2018-01-17 RX ADMIN — Medication SCH ML: at 09:02

## 2018-01-17 RX ADMIN — FAMOTIDINE SCH MG: 10 INJECTION, SOLUTION INTRAVENOUS at 09:02

## 2018-01-18 LAB
ANION GAP SERPL CALC-SCNC: 10 MMOL/L (ref 10–20)
BASOPHILS # BLD AUTO: 0 THOU/UL (ref 0–0.2)
BASOPHILS NFR BLD AUTO: 0.1 % (ref 0–1)
BUN SERPL-MCNC: 17 MG/DL (ref 7–18.7)
CALCIUM SERPL-MCNC: 9.1 MG/DL (ref 7.8–10.44)
CHLORIDE SERPL-SCNC: 108 MMOL/L (ref 98–107)
CO2 SERPL-SCNC: 28 MMOL/L (ref 22–29)
CREAT CL PREDICTED SERPL C-G-VRATE: 124 ML/MIN (ref 70–130)
EOSINOPHIL # BLD AUTO: 0 THOU/UL (ref 0–0.7)
EOSINOPHIL NFR BLD AUTO: 0.1 % (ref 0–10)
GLUCOSE SERPL-MCNC: 105 MG/DL (ref 70–105)
HGB BLD-MCNC: 12.1 G/DL (ref 12–16)
LYMPHOCYTES # BLD: 1.4 THOU/UL (ref 1.2–3.4)
LYMPHOCYTES NFR BLD AUTO: 12 % (ref 21–51)
MCH RBC QN AUTO: 29.9 PG (ref 27–31)
MCV RBC AUTO: 92.1 FL (ref 81–99)
MONOCYTES # BLD AUTO: 0.9 THOU/UL (ref 0.11–0.59)
MONOCYTES NFR BLD AUTO: 8 % (ref 0–10)
NEUTROPHILS # BLD AUTO: 9.4 THOU/UL (ref 1.4–6.5)
NEUTROPHILS NFR BLD AUTO: 79.8 % (ref 42–75)
PLATELET # BLD AUTO: 206 THOU/UL (ref 130–400)
POTASSIUM SERPL-SCNC: 4.1 MMOL/L (ref 3.5–5.1)
RBC # BLD AUTO: 4.07 MILL/UL (ref 4.2–5.4)
SODIUM SERPL-SCNC: 142 MMOL/L (ref 136–145)
WBC # BLD AUTO: 11.8 THOU/UL (ref 4.8–10.8)

## 2018-01-18 RX ADMIN — Medication PRN ML: at 11:32

## 2018-01-19 VITALS — DIASTOLIC BLOOD PRESSURE: 80 MMHG | SYSTOLIC BLOOD PRESSURE: 128 MMHG | TEMPERATURE: 98.3 F

## 2018-02-12 ENCOUNTER — HOSPITAL ENCOUNTER (EMERGENCY)
Dept: HOSPITAL 92 - ERS | Age: 48
Discharge: HOME | End: 2018-02-12
Payer: COMMERCIAL

## 2018-02-12 DIAGNOSIS — F41.9: ICD-10-CM

## 2018-02-12 DIAGNOSIS — F32.9: ICD-10-CM

## 2018-02-12 DIAGNOSIS — F17.210: ICD-10-CM

## 2018-02-12 DIAGNOSIS — M54.2: ICD-10-CM

## 2018-02-12 DIAGNOSIS — J44.1: Primary | ICD-10-CM

## 2018-02-12 PROCEDURE — 94640 AIRWAY INHALATION TREATMENT: CPT

## 2018-02-12 PROCEDURE — 96375 TX/PRO/DX INJ NEW DRUG ADDON: CPT

## 2018-02-12 PROCEDURE — 71045 X-RAY EXAM CHEST 1 VIEW: CPT

## 2018-02-12 PROCEDURE — 96365 THER/PROPH/DIAG IV INF INIT: CPT

## 2018-02-12 PROCEDURE — 70360 X-RAY EXAM OF NECK: CPT

## 2018-03-07 ENCOUNTER — HOSPITAL ENCOUNTER (OUTPATIENT)
Dept: HOSPITAL 92 - CT | Age: 48
Discharge: HOME | End: 2018-03-07
Attending: INTERNAL MEDICINE
Payer: COMMERCIAL

## 2018-03-07 DIAGNOSIS — R91.1: Primary | ICD-10-CM

## 2018-03-07 DIAGNOSIS — J44.9: ICD-10-CM

## 2018-03-07 DIAGNOSIS — J98.59: ICD-10-CM

## 2018-03-07 PROCEDURE — 71260 CT THORAX DX C+: CPT

## 2018-05-26 ENCOUNTER — HOSPITAL ENCOUNTER (EMERGENCY)
Dept: HOSPITAL 92 - ERS | Age: 48
Discharge: HOME | End: 2018-05-26
Payer: COMMERCIAL

## 2018-05-26 DIAGNOSIS — J45.909: ICD-10-CM

## 2018-05-26 DIAGNOSIS — F17.210: ICD-10-CM

## 2018-05-26 DIAGNOSIS — F32.9: ICD-10-CM

## 2018-05-26 DIAGNOSIS — Z79.899: ICD-10-CM

## 2018-05-26 DIAGNOSIS — M47.9: Primary | ICD-10-CM

## 2018-05-26 DIAGNOSIS — F41.9: ICD-10-CM

## 2018-05-26 PROCEDURE — 96361 HYDRATE IV INFUSION ADD-ON: CPT

## 2018-05-26 PROCEDURE — 96375 TX/PRO/DX INJ NEW DRUG ADDON: CPT

## 2018-05-26 PROCEDURE — 72131 CT LUMBAR SPINE W/O DYE: CPT

## 2018-05-26 PROCEDURE — 72100 X-RAY EXAM L-S SPINE 2/3 VWS: CPT

## 2018-05-26 PROCEDURE — 96374 THER/PROPH/DIAG INJ IV PUSH: CPT

## 2018-05-26 PROCEDURE — 96376 TX/PRO/DX INJ SAME DRUG ADON: CPT

## 2018-06-02 ENCOUNTER — HOSPITAL ENCOUNTER (INPATIENT)
Dept: HOSPITAL 92 - ERS | Age: 48
LOS: 4 days | Discharge: HOME | DRG: 871 | End: 2018-06-06
Attending: INTERNAL MEDICINE | Admitting: INTERNAL MEDICINE
Payer: COMMERCIAL

## 2018-06-02 VITALS — BODY MASS INDEX: 27.8 KG/M2

## 2018-06-02 DIAGNOSIS — A41.9: Primary | ICD-10-CM

## 2018-06-02 DIAGNOSIS — J96.20: ICD-10-CM

## 2018-06-02 DIAGNOSIS — F41.9: ICD-10-CM

## 2018-06-02 DIAGNOSIS — F17.210: ICD-10-CM

## 2018-06-02 DIAGNOSIS — M54.9: ICD-10-CM

## 2018-06-02 DIAGNOSIS — E87.2: ICD-10-CM

## 2018-06-02 DIAGNOSIS — J18.9: ICD-10-CM

## 2018-06-02 DIAGNOSIS — G25.81: ICD-10-CM

## 2018-06-02 DIAGNOSIS — J44.0: ICD-10-CM

## 2018-06-02 DIAGNOSIS — F32.9: ICD-10-CM

## 2018-06-02 DIAGNOSIS — J44.1: ICD-10-CM

## 2018-06-02 DIAGNOSIS — J38.3: ICD-10-CM

## 2018-06-02 LAB
ALBUMIN SERPL BCG-MCNC: 4 G/DL (ref 3.5–5)
ALP SERPL-CCNC: 66 U/L (ref 40–150)
ALT SERPL W P-5'-P-CCNC: 16 U/L (ref 8–55)
ANION GAP SERPL CALC-SCNC: 14 MMOL/L (ref 10–20)
AST SERPL-CCNC: 20 U/L (ref 5–34)
BILIRUB SERPL-MCNC: 0.9 MG/DL (ref 0.2–1.2)
BUN SERPL-MCNC: 15 MG/DL (ref 7–18.7)
CALCIUM SERPL-MCNC: 9.4 MG/DL (ref 7.8–10.44)
CHLORIDE SERPL-SCNC: 103 MMOL/L (ref 98–107)
CK MB SERPL-MCNC: 1.7 NG/ML (ref 0–6.6)
CK SERPL-CCNC: 313 U/L (ref 29–168)
CO2 SERPL-SCNC: 21 MMOL/L (ref 22–29)
CREAT CL PREDICTED SERPL C-G-VRATE: 0 ML/MIN (ref 70–130)
CRYSTAL-AUWI FLAG: 0 (ref 0–15)
DRUG SCREEN CUTOFF: (no result)
GLOBULIN SER CALC-MCNC: 3.4 G/DL (ref 2.4–3.5)
GLUCOSE SERPL-MCNC: 98 MG/DL (ref 70–105)
HEV IGM SER QL: 0 (ref 0–7.99)
HGB BLD-MCNC: 13.5 G/DL (ref 12–16)
HYALINE CASTS #/AREA URNS LPF: (no result) LPF
LIPASE SERPL-CCNC: 25 U/L (ref 8–78)
MCH RBC QN AUTO: 28.2 PG (ref 27–31)
MCV RBC AUTO: 88.1 FL (ref 81–99)
MDIFF COMPLETE?: YES
MEDTOX CONTROL LINE VALID?: (no result)
MEDTOX READER #: (no result)
PATHC CAST-AUWI FLAG: 0.43 (ref 0–2.49)
PLATELET # BLD AUTO: 256 THOU/UL (ref 130–400)
PLATELET BLD QL SMEAR: (no result)
POTASSIUM SERPL-SCNC: 4.1 MMOL/L (ref 3.5–5.1)
RBC # BLD AUTO: 4.78 MILL/UL (ref 4.2–5.4)
RBC UR QL AUTO: (no result) HPF (ref 0–3)
SODIUM SERPL-SCNC: 134 MMOL/L (ref 136–145)
SP GR UR STRIP: 1.01 (ref 1–1.04)
SPERM-AUWI FLAG: 0 (ref 0–9.9)
TROPONIN I SERPL DL<=0.01 NG/ML-MCNC: (no result) NG/ML (ref ?–0.03)
WBC # BLD AUTO: 24.8 THOU/UL (ref 4.8–10.8)
YEAST-AUWI FLAG: 0 (ref 0–25)

## 2018-06-02 PROCEDURE — 96365 THER/PROPH/DIAG IV INF INIT: CPT

## 2018-06-02 PROCEDURE — 81003 URINALYSIS AUTO W/O SCOPE: CPT

## 2018-06-02 PROCEDURE — 36415 COLL VENOUS BLD VENIPUNCTURE: CPT

## 2018-06-02 PROCEDURE — 80306 DRUG TEST PRSMV INSTRMNT: CPT

## 2018-06-02 PROCEDURE — 84484 ASSAY OF TROPONIN QUANT: CPT

## 2018-06-02 PROCEDURE — 83605 ASSAY OF LACTIC ACID: CPT

## 2018-06-02 PROCEDURE — 96374 THER/PROPH/DIAG INJ IV PUSH: CPT

## 2018-06-02 PROCEDURE — 71275 CT ANGIOGRAPHY CHEST: CPT

## 2018-06-02 PROCEDURE — 85025 COMPLETE CBC W/AUTO DIFF WBC: CPT

## 2018-06-02 PROCEDURE — 82553 CREATINE MB FRACTION: CPT

## 2018-06-02 PROCEDURE — 94640 AIRWAY INHALATION TREATMENT: CPT

## 2018-06-02 PROCEDURE — 83690 ASSAY OF LIPASE: CPT

## 2018-06-02 PROCEDURE — 71045 X-RAY EXAM CHEST 1 VIEW: CPT

## 2018-06-02 PROCEDURE — 93005 ELECTROCARDIOGRAM TRACING: CPT

## 2018-06-02 PROCEDURE — 96361 HYDRATE IV INFUSION ADD-ON: CPT

## 2018-06-02 PROCEDURE — 96375 TX/PRO/DX INJ NEW DRUG ADDON: CPT

## 2018-06-02 PROCEDURE — 81015 MICROSCOPIC EXAM OF URINE: CPT

## 2018-06-02 PROCEDURE — 87086 URINE CULTURE/COLONY COUNT: CPT

## 2018-06-02 PROCEDURE — 80048 BASIC METABOLIC PNL TOTAL CA: CPT

## 2018-06-02 PROCEDURE — 87040 BLOOD CULTURE FOR BACTERIA: CPT

## 2018-06-02 PROCEDURE — 83880 ASSAY OF NATRIURETIC PEPTIDE: CPT

## 2018-06-02 PROCEDURE — 80053 COMPREHEN METABOLIC PANEL: CPT

## 2018-06-02 PROCEDURE — 71046 X-RAY EXAM CHEST 2 VIEWS: CPT

## 2018-06-02 RX ADMIN — HYDROCODONE BITARTRATE AND ACETAMINOPHEN PRN TAB: 10; 325 TABLET ORAL at 20:32

## 2018-06-02 RX ADMIN — AZITHROMYCIN SCH MLS: 500 INJECTION, POWDER, LYOPHILIZED, FOR SOLUTION INTRAVENOUS at 17:04

## 2018-06-02 RX ADMIN — CEFTRIAXONE SCH MLS: 1 INJECTION, POWDER, FOR SOLUTION INTRAMUSCULAR; INTRAVENOUS at 16:23

## 2018-06-03 LAB
ANION GAP SERPL CALC-SCNC: 11 MMOL/L (ref 10–20)
BASOPHILS # BLD AUTO: 0 THOU/UL (ref 0–0.2)
BASOPHILS NFR BLD AUTO: 0 % (ref 0–1)
BUN SERPL-MCNC: 13 MG/DL (ref 7–18.7)
CALCIUM SERPL-MCNC: 8.8 MG/DL (ref 7.8–10.44)
CHLORIDE SERPL-SCNC: 108 MMOL/L (ref 98–107)
CO2 SERPL-SCNC: 23 MMOL/L (ref 22–29)
CREAT CL PREDICTED SERPL C-G-VRATE: 131 ML/MIN (ref 70–130)
EOSINOPHIL # BLD AUTO: 0 THOU/UL (ref 0–0.7)
EOSINOPHIL NFR BLD AUTO: 0 % (ref 0–10)
GLUCOSE SERPL-MCNC: 164 MG/DL (ref 70–105)
HGB BLD-MCNC: 12.1 G/DL (ref 12–16)
LYMPHOCYTES # BLD: 1.2 THOU/UL (ref 1.2–3.4)
LYMPHOCYTES NFR BLD AUTO: 6.2 % (ref 21–51)
MCH RBC QN AUTO: 28.9 PG (ref 27–31)
MCV RBC AUTO: 88.8 FL (ref 81–99)
MONOCYTES # BLD AUTO: 0.5 THOU/UL (ref 0.11–0.59)
MONOCYTES NFR BLD AUTO: 2.4 % (ref 0–10)
NEUTROPHILS # BLD AUTO: 17.6 THOU/UL (ref 1.4–6.5)
NEUTROPHILS NFR BLD AUTO: 91.4 % (ref 42–75)
PLATELET # BLD AUTO: 230 THOU/UL (ref 130–400)
POTASSIUM SERPL-SCNC: 3.9 MMOL/L (ref 3.5–5.1)
RBC # BLD AUTO: 4.18 MILL/UL (ref 4.2–5.4)
SODIUM SERPL-SCNC: 138 MMOL/L (ref 136–145)
WBC # BLD AUTO: 19.3 THOU/UL (ref 4.8–10.8)

## 2018-06-03 RX ADMIN — HYDROCODONE BITARTRATE AND ACETAMINOPHEN PRN TAB: 10; 325 TABLET ORAL at 14:26

## 2018-06-03 RX ADMIN — Medication PRN ML: at 22:36

## 2018-06-03 RX ADMIN — HYDROCODONE BITARTRATE AND ACETAMINOPHEN PRN TAB: 10; 325 TABLET ORAL at 19:21

## 2018-06-03 RX ADMIN — CEFTRIAXONE SCH MLS: 1 INJECTION, POWDER, FOR SOLUTION INTRAMUSCULAR; INTRAVENOUS at 15:53

## 2018-06-03 RX ADMIN — HYDROCODONE BITARTRATE AND ACETAMINOPHEN PRN TAB: 10; 325 TABLET ORAL at 08:36

## 2018-06-03 RX ADMIN — AZITHROMYCIN SCH MLS: 500 INJECTION, POWDER, LYOPHILIZED, FOR SOLUTION INTRAVENOUS at 16:44

## 2018-06-04 LAB
ANION GAP SERPL CALC-SCNC: 15 MMOL/L (ref 10–20)
BUN SERPL-MCNC: 13 MG/DL (ref 7–18.7)
CALCIUM SERPL-MCNC: 9.3 MG/DL (ref 7.8–10.44)
CHLORIDE SERPL-SCNC: 105 MMOL/L (ref 98–107)
CO2 SERPL-SCNC: 23 MMOL/L (ref 22–29)
CREAT CL PREDICTED SERPL C-G-VRATE: 119 ML/MIN (ref 70–130)
GLUCOSE SERPL-MCNC: 144 MG/DL (ref 70–105)
HGB BLD-MCNC: 12 G/DL (ref 12–16)
MCH RBC QN AUTO: 28.5 PG (ref 27–31)
MCV RBC AUTO: 90 FL (ref 81–99)
MDIFF COMPLETE?: YES
PLATELET # BLD AUTO: 256 THOU/UL (ref 130–400)
PLATELET BLD QL SMEAR: (no result)
POTASSIUM SERPL-SCNC: 4.1 MMOL/L (ref 3.5–5.1)
RBC # BLD AUTO: 4.2 MILL/UL (ref 4.2–5.4)
SODIUM SERPL-SCNC: 139 MMOL/L (ref 136–145)
WBC # BLD AUTO: 22.5 THOU/UL (ref 4.8–10.8)

## 2018-06-04 RX ADMIN — AZITHROMYCIN SCH MLS: 500 INJECTION, POWDER, LYOPHILIZED, FOR SOLUTION INTRAVENOUS at 16:35

## 2018-06-04 RX ADMIN — Medication SCH ML: at 08:44

## 2018-06-04 RX ADMIN — Medication PRN ML: at 06:22

## 2018-06-04 RX ADMIN — HYDROCODONE BITARTRATE AND ACETAMINOPHEN PRN TAB: 10; 325 TABLET ORAL at 20:12

## 2018-06-04 RX ADMIN — HYDROCODONE BITARTRATE AND ACETAMINOPHEN PRN TAB: 10; 325 TABLET ORAL at 14:26

## 2018-06-04 RX ADMIN — CEFTRIAXONE SCH MLS: 1 INJECTION, POWDER, FOR SOLUTION INTRAMUSCULAR; INTRAVENOUS at 15:46

## 2018-06-04 RX ADMIN — HYDROCODONE BITARTRATE AND ACETAMINOPHEN PRN TAB: 10; 325 TABLET ORAL at 10:23

## 2018-06-04 RX ADMIN — Medication PRN ML: at 22:47

## 2018-06-04 RX ADMIN — HYDROCODONE BITARTRATE AND ACETAMINOPHEN PRN TAB: 10; 325 TABLET ORAL at 00:32

## 2018-06-04 RX ADMIN — Medication SCH ML: at 20:12

## 2018-06-05 RX ADMIN — Medication SCH ML: at 20:08

## 2018-06-05 RX ADMIN — HYDROCODONE BITARTRATE AND ACETAMINOPHEN PRN TAB: 10; 325 TABLET ORAL at 20:00

## 2018-06-05 RX ADMIN — Medication PRN ML: at 05:19

## 2018-06-05 RX ADMIN — Medication SCH ML: at 09:11

## 2018-06-05 RX ADMIN — HYDROCODONE BITARTRATE AND ACETAMINOPHEN PRN TAB: 10; 325 TABLET ORAL at 07:40

## 2018-06-05 RX ADMIN — HYDROCODONE BITARTRATE AND ACETAMINOPHEN PRN TAB: 10; 325 TABLET ORAL at 12:44

## 2018-06-06 VITALS — TEMPERATURE: 97.6 F | DIASTOLIC BLOOD PRESSURE: 97 MMHG | SYSTOLIC BLOOD PRESSURE: 171 MMHG

## 2018-06-06 LAB
ANION GAP SERPL CALC-SCNC: 12 MMOL/L (ref 10–20)
BASOPHILS # BLD AUTO: 0.1 THOU/UL (ref 0–0.2)
BASOPHILS NFR BLD AUTO: 0.4 % (ref 0–1)
BUN SERPL-MCNC: 20 MG/DL (ref 7–18.7)
CALCIUM SERPL-MCNC: 9.2 MG/DL (ref 7.8–10.44)
CHLORIDE SERPL-SCNC: 101 MMOL/L (ref 98–107)
CO2 SERPL-SCNC: 30 MMOL/L (ref 22–29)
CREAT CL PREDICTED SERPL C-G-VRATE: 120 ML/MIN (ref 70–130)
EOSINOPHIL # BLD AUTO: 0.1 THOU/UL (ref 0–0.7)
EOSINOPHIL NFR BLD AUTO: 0.6 % (ref 0–10)
GLUCOSE SERPL-MCNC: 77 MG/DL (ref 70–105)
HGB BLD-MCNC: 12.1 G/DL (ref 12–16)
LYMPHOCYTES # BLD: 5.6 THOU/UL (ref 1.2–3.4)
LYMPHOCYTES NFR BLD AUTO: 38.8 % (ref 21–51)
MCH RBC QN AUTO: 28.6 PG (ref 27–31)
MCV RBC AUTO: 89 FL (ref 81–99)
MONOCYTES # BLD AUTO: 1.4 THOU/UL (ref 0.11–0.59)
MONOCYTES NFR BLD AUTO: 9.6 % (ref 0–10)
NEUTROPHILS # BLD AUTO: 7.3 THOU/UL (ref 1.4–6.5)
NEUTROPHILS NFR BLD AUTO: 50.5 % (ref 42–75)
PLATELET # BLD AUTO: 255 THOU/UL (ref 130–400)
POTASSIUM SERPL-SCNC: 4 MMOL/L (ref 3.5–5.1)
RBC # BLD AUTO: 4.23 MILL/UL (ref 4.2–5.4)
SODIUM SERPL-SCNC: 139 MMOL/L (ref 136–145)
WBC # BLD AUTO: 14.4 THOU/UL (ref 4.8–10.8)

## 2018-06-06 RX ADMIN — HYDROCODONE BITARTRATE AND ACETAMINOPHEN PRN TAB: 10; 325 TABLET ORAL at 05:22

## 2018-07-05 ENCOUNTER — HOSPITAL ENCOUNTER (OUTPATIENT)
Dept: HOSPITAL 92 - BICCT | Age: 48
Discharge: HOME | End: 2018-07-05
Attending: INTERNAL MEDICINE
Payer: COMMERCIAL

## 2018-07-05 DIAGNOSIS — J44.9: ICD-10-CM

## 2018-07-05 DIAGNOSIS — R91.1: Primary | ICD-10-CM

## 2018-07-05 PROCEDURE — 71250 CT THORAX DX C-: CPT

## 2018-07-06 ENCOUNTER — HOSPITAL ENCOUNTER (INPATIENT)
Dept: HOSPITAL 92 - ERS | Age: 48
LOS: 1 days | Discharge: HOME | DRG: 189 | End: 2018-07-07
Attending: HOSPITALIST | Admitting: HOSPITALIST
Payer: COMMERCIAL

## 2018-07-06 VITALS — BODY MASS INDEX: 29.2 KG/M2

## 2018-07-06 DIAGNOSIS — F41.9: ICD-10-CM

## 2018-07-06 DIAGNOSIS — J44.1: ICD-10-CM

## 2018-07-06 DIAGNOSIS — F17.210: ICD-10-CM

## 2018-07-06 DIAGNOSIS — G25.81: ICD-10-CM

## 2018-07-06 DIAGNOSIS — E86.0: ICD-10-CM

## 2018-07-06 DIAGNOSIS — J96.01: Primary | ICD-10-CM

## 2018-07-06 DIAGNOSIS — B96.89: ICD-10-CM

## 2018-07-06 DIAGNOSIS — M79.7: ICD-10-CM

## 2018-07-06 LAB
ALBUMIN SERPL BCG-MCNC: 4.1 G/DL (ref 3.5–5)
ALP SERPL-CCNC: 65 U/L (ref 40–150)
ALT SERPL W P-5'-P-CCNC: 17 U/L (ref 8–55)
ANALYZER IN CARDIO: (no result)
ANION GAP SERPL CALC-SCNC: 15 MMOL/L (ref 10–20)
AST SERPL-CCNC: 19 U/L (ref 5–34)
BACTERIA UR QL AUTO: (no result) HPF
BASE EXCESS STD BLDA CALC-SCNC: -0.4 MEQ/L
BILIRUB SERPL-MCNC: 0.4 MG/DL (ref 0.2–1.2)
BUN SERPL-MCNC: 16 MG/DL (ref 7–18.7)
CA-I BLDA-SCNC: 1.2 MMOL/L (ref 1.12–1.3)
CALCIUM SERPL-MCNC: 9.7 MG/DL (ref 7.8–10.44)
CHLORIDE SERPL-SCNC: 105 MMOL/L (ref 98–107)
CK MB SERPL-MCNC: 2.1 NG/ML (ref 0–6.6)
CK SERPL-CCNC: 234 U/L (ref 29–168)
CO2 SERPL-SCNC: 23 MMOL/L (ref 22–29)
CREAT CL PREDICTED SERPL C-G-VRATE: 0 ML/MIN (ref 70–130)
CRYSTAL-AUWI FLAG: 57.7 (ref 0–15)
CRYSTALS URNS QL MICRO: (no result) HPF
GLOBULIN SER CALC-MCNC: 3.3 G/DL (ref 2.4–3.5)
GLUCOSE SERPL-MCNC: 105 MG/DL (ref 70–105)
HCO3 BLDA-SCNC: 24.7 MEQ/L (ref 22–28)
HCT VFR BLDA CALC: 46.5 % (ref 36–47)
HEV IGM SER QL: 2.7 (ref 0–7.99)
HGB BLD-MCNC: 14.1 G/DL (ref 12–16)
HGB BLDA-MCNC: 13.9 G/DL (ref 12–16)
HYALINE CASTS #/AREA URNS LPF: (no result) LPF
LIPASE SERPL-CCNC: 101 U/L (ref 8–78)
MCH RBC QN AUTO: 28.8 PG (ref 27–31)
MCV RBC AUTO: 86.5 FL (ref 78–98)
MDIFF COMPLETE?: YES
PATHC CAST-AUWI FLAG: 2.32 (ref 0–2.49)
PCO2 BLDA: 42.4 MMHG (ref 35–45)
PH BLDA: 7.38 [PH] (ref 7.35–7.45)
PLATELET # BLD AUTO: 261 THOU/UL (ref 130–400)
PO2 BLDA: 39 MMHG (ref 80–100)
POTASSIUM SERPL-SCNC: 4 MMOL/L (ref 3.5–5.1)
RBC # BLD AUTO: 4.89 MILL/UL (ref 4.2–5.4)
RBC UR QL AUTO: (no result) HPF (ref 0–3)
SODIUM SERPL-SCNC: 139 MMOL/L (ref 136–145)
SP GR UR STRIP: 1.02 (ref 1–1.04)
SPECIMEN DRAWN FROM PATIENT: (no result)
SPERM-AUWI FLAG: 0 (ref 0–9.9)
TROPONIN I SERPL DL<=0.01 NG/ML-MCNC: (no result) NG/ML (ref ?–0.03)
WBC # BLD AUTO: 18.4 THOU/UL (ref 4.8–10.8)
WBC UR QL AUTO: (no result) HPF (ref 0–3)
YEAST-AUWI FLAG: 0 (ref 0–25)

## 2018-07-06 PROCEDURE — 80053 COMPREHEN METABOLIC PANEL: CPT

## 2018-07-06 PROCEDURE — 96366 THER/PROPH/DIAG IV INF ADDON: CPT

## 2018-07-06 PROCEDURE — 36415 COLL VENOUS BLD VENIPUNCTURE: CPT

## 2018-07-06 PROCEDURE — 87040 BLOOD CULTURE FOR BACTERIA: CPT

## 2018-07-06 PROCEDURE — 83880 ASSAY OF NATRIURETIC PEPTIDE: CPT

## 2018-07-06 PROCEDURE — 82805 BLOOD GASES W/O2 SATURATION: CPT

## 2018-07-06 PROCEDURE — 71045 X-RAY EXAM CHEST 1 VIEW: CPT

## 2018-07-06 PROCEDURE — 5A09357 ASSISTANCE WITH RESPIRATORY VENTILATION, LESS THAN 24 CONSECUTIVE HOURS, CONTINUOUS POSITIVE AIRWAY PRESSURE: ICD-10-PCS | Performed by: INTERNAL MEDICINE

## 2018-07-06 PROCEDURE — 85025 COMPLETE CBC W/AUTO DIFF WBC: CPT

## 2018-07-06 PROCEDURE — 93005 ELECTROCARDIOGRAM TRACING: CPT

## 2018-07-06 PROCEDURE — 96368 THER/DIAG CONCURRENT INF: CPT

## 2018-07-06 PROCEDURE — 84484 ASSAY OF TROPONIN QUANT: CPT

## 2018-07-06 PROCEDURE — 83690 ASSAY OF LIPASE: CPT

## 2018-07-06 PROCEDURE — 87149 DNA/RNA DIRECT PROBE: CPT

## 2018-07-06 PROCEDURE — 83605 ASSAY OF LACTIC ACID: CPT

## 2018-07-06 PROCEDURE — 96365 THER/PROPH/DIAG IV INF INIT: CPT

## 2018-07-06 PROCEDURE — 81015 MICROSCOPIC EXAM OF URINE: CPT

## 2018-07-06 PROCEDURE — 96375 TX/PRO/DX INJ NEW DRUG ADDON: CPT

## 2018-07-06 PROCEDURE — 87086 URINE CULTURE/COLONY COUNT: CPT

## 2018-07-06 PROCEDURE — 82553 CREATINE MB FRACTION: CPT

## 2018-07-06 PROCEDURE — 85379 FIBRIN DEGRADATION QUANT: CPT

## 2018-07-06 PROCEDURE — 94640 AIRWAY INHALATION TREATMENT: CPT

## 2018-07-06 PROCEDURE — 81003 URINALYSIS AUTO W/O SCOPE: CPT

## 2018-07-06 PROCEDURE — 80048 BASIC METABOLIC PNL TOTAL CA: CPT

## 2018-07-07 VITALS — TEMPERATURE: 98.4 F | DIASTOLIC BLOOD PRESSURE: 79 MMHG | SYSTOLIC BLOOD PRESSURE: 128 MMHG

## 2018-07-07 LAB
ANION GAP SERPL CALC-SCNC: 14 MMOL/L (ref 10–20)
BASOPHILS # BLD AUTO: 0 THOU/UL (ref 0–0.2)
BASOPHILS NFR BLD AUTO: 0.1 % (ref 0–1)
BUN SERPL-MCNC: 12 MG/DL (ref 7–18.7)
CALCIUM SERPL-MCNC: 9.2 MG/DL (ref 7.8–10.44)
CHLORIDE SERPL-SCNC: 110 MMOL/L (ref 98–107)
CO2 SERPL-SCNC: 21 MMOL/L (ref 22–29)
CREAT CL PREDICTED SERPL C-G-VRATE: 135 ML/MIN (ref 70–130)
EOSINOPHIL # BLD AUTO: 0 THOU/UL (ref 0–0.7)
EOSINOPHIL NFR BLD AUTO: 0.1 % (ref 0–10)
GLUCOSE SERPL-MCNC: 132 MG/DL (ref 70–105)
HGB BLD-MCNC: 12.1 G/DL (ref 12–16)
LYMPHOCYTES # BLD: 1.2 THOU/UL (ref 1.2–3.4)
LYMPHOCYTES NFR BLD AUTO: 7.8 % (ref 21–51)
MCH RBC QN AUTO: 29 PG (ref 27–31)
MCV RBC AUTO: 87.9 FL (ref 78–98)
MONOCYTES # BLD AUTO: 0.5 THOU/UL (ref 0.11–0.59)
MONOCYTES NFR BLD AUTO: 3 % (ref 0–10)
NEUTROPHILS # BLD AUTO: 13.7 THOU/UL (ref 1.4–6.5)
NEUTROPHILS NFR BLD AUTO: 89.1 % (ref 42–75)
PLATELET # BLD AUTO: 235 THOU/UL (ref 130–400)
POTASSIUM SERPL-SCNC: 3.7 MMOL/L (ref 3.5–5.1)
RBC # BLD AUTO: 4.18 MILL/UL (ref 4.2–5.4)
SODIUM SERPL-SCNC: 141 MMOL/L (ref 136–145)
WBC # BLD AUTO: 15.3 THOU/UL (ref 4.8–10.8)

## 2018-08-13 ENCOUNTER — HOSPITAL ENCOUNTER (EMERGENCY)
Dept: HOSPITAL 92 - ERS | Age: 48
Discharge: HOME | End: 2018-08-13
Payer: COMMERCIAL

## 2018-08-13 DIAGNOSIS — F41.9: ICD-10-CM

## 2018-08-13 DIAGNOSIS — F17.210: ICD-10-CM

## 2018-08-13 DIAGNOSIS — Z79.899: ICD-10-CM

## 2018-08-13 DIAGNOSIS — J44.1: Primary | ICD-10-CM

## 2018-08-13 LAB
ALBUMIN SERPL BCG-MCNC: 4.1 G/DL (ref 3.5–5)
ALP SERPL-CCNC: 63 U/L (ref 40–150)
ALT SERPL W P-5'-P-CCNC: 16 U/L (ref 8–55)
ANION GAP SERPL CALC-SCNC: 13 MMOL/L (ref 10–20)
AST SERPL-CCNC: 20 U/L (ref 5–34)
BASOPHILS # BLD AUTO: 0 THOU/UL (ref 0–0.2)
BASOPHILS NFR BLD AUTO: 0.5 % (ref 0–1)
BICARBONATE (HCO3V): 20.5 MMOL/L (ref 1–85)
BILIRUB SERPL-MCNC: 0.4 MG/DL (ref 0.2–1.2)
BUN SERPL-MCNC: 14 MG/DL (ref 7–18.7)
CA-I BLD-SCNC: 1.13 MMOL/L (ref 1.12–1.32)
CALCIUM SERPL-MCNC: 9.4 MG/DL (ref 7.8–10.44)
CHLORIDE SERPL-SCNC: 108 MMOL/L (ref 98–107)
CHLORIDE SERPL-SCNC: 110 MMOL/L (ref 98–113)
CK MB SERPL-MCNC: 2.8 NG/ML (ref 0–6.6)
CK SERPL-CCNC: 260 U/L (ref 29–168)
CO2 BLDV CALC-SCNC: 21.5 MMOL/L (ref 1–85)
CO2 SERPL-SCNC: 18 MMOL/L (ref 22–29)
CO2 TENSION (PVCO2): 31.5 MMHG (ref 41–51)
CREAT CL PREDICTED SERPL C-G-VRATE: 0 ML/MIN (ref 70–130)
EOSINOPHIL # BLD AUTO: 0.5 THOU/UL (ref 0–0.7)
EOSINOPHIL NFR BLD AUTO: 6.3 % (ref 0–10)
GLOBULIN SER CALC-MCNC: 3.9 G/DL (ref 2.4–3.5)
GLUCOSE SERPL-MCNC: 95 MG/DL (ref 70–105)
HCT VFR BLD CALC: 42 % (ref 36–52)
HEMOGLOBIN - CALC: 14.1 G/DL (ref 12–18)
HGB BLD-MCNC: 14 G/DL (ref 12–16)
LYMPHOCYTES # BLD: 3.2 THOU/UL (ref 1.2–3.4)
LYMPHOCYTES NFR BLD AUTO: 38.8 % (ref 21–51)
MCH RBC QN AUTO: 28.4 PG (ref 27–31)
MCV RBC AUTO: 86.3 FL (ref 78–98)
MONOCYTES # BLD AUTO: 0.9 THOU/UL (ref 0.11–0.59)
MONOCYTES NFR BLD AUTO: 11.3 % (ref 0–10)
NEUTROPHILS # BLD AUTO: 3.6 THOU/UL (ref 1.4–6.5)
NEUTROPHILS NFR BLD AUTO: 43.1 % (ref 42–75)
O2 TENSION (PVO2): 70.3 MMHG (ref 35–45)
PLATELET # BLD AUTO: 244 THOU/UL (ref 130–400)
POTASSIUM SERPL-SCNC: 3.6 MMOL/L (ref 3.4–4.7)
POTASSIUM SERPL-SCNC: 3.9 MMOL/L (ref 3.5–5.1)
RBC # BLD AUTO: 4.93 MILL/UL (ref 4.2–5.4)
SAO2 % BLDV FROM PO2: 94.5 % (ref 94–98)
SODIUM SERPL-SCNC: 135 MMOL/L (ref 136–145)
SODIUM SERPL-SCNC: 139 MMOL/L (ref 138–145)
TROPONIN I SERPL DL<=0.01 NG/ML-MCNC: (no result) NG/ML (ref ?–0.03)
WBC # BLD AUTO: 8.3 THOU/UL (ref 4.8–10.8)

## 2018-08-13 PROCEDURE — 93005 ELECTROCARDIOGRAM TRACING: CPT

## 2018-08-13 PROCEDURE — 71045 X-RAY EXAM CHEST 1 VIEW: CPT

## 2018-08-13 PROCEDURE — 82330 ASSAY OF CALCIUM: CPT

## 2018-08-13 PROCEDURE — 96365 THER/PROPH/DIAG IV INF INIT: CPT

## 2018-08-13 PROCEDURE — 36415 COLL VENOUS BLD VENIPUNCTURE: CPT

## 2018-08-13 PROCEDURE — 84484 ASSAY OF TROPONIN QUANT: CPT

## 2018-08-13 PROCEDURE — 82553 CREATINE MB FRACTION: CPT

## 2018-08-13 PROCEDURE — 82803 BLOOD GASES ANY COMBINATION: CPT

## 2018-08-13 PROCEDURE — 85025 COMPLETE CBC W/AUTO DIFF WBC: CPT

## 2018-08-13 PROCEDURE — 96375 TX/PRO/DX INJ NEW DRUG ADDON: CPT

## 2018-08-13 PROCEDURE — 80053 COMPREHEN METABOLIC PANEL: CPT

## 2018-09-24 ENCOUNTER — HOSPITAL ENCOUNTER (EMERGENCY)
Dept: HOSPITAL 92 - ERS | Age: 48
Discharge: HOME | End: 2018-09-24
Payer: COMMERCIAL

## 2018-09-24 DIAGNOSIS — F17.210: ICD-10-CM

## 2018-09-24 DIAGNOSIS — J44.1: Primary | ICD-10-CM

## 2018-09-24 DIAGNOSIS — Z79.891: ICD-10-CM

## 2018-09-24 DIAGNOSIS — Z79.899: ICD-10-CM

## 2018-09-24 DIAGNOSIS — F41.9: ICD-10-CM

## 2018-09-24 LAB
ALBUMIN SERPL BCG-MCNC: 4.3 G/DL (ref 3.5–5)
ALP SERPL-CCNC: 65 U/L (ref 40–150)
ALT SERPL W P-5'-P-CCNC: 20 U/L (ref 8–55)
ANION GAP SERPL CALC-SCNC: 16 MMOL/L (ref 10–20)
AST SERPL-CCNC: 20 U/L (ref 5–34)
BICARBONATE (HCO3V): 23.4 MMOL/L (ref 1–85)
BILIRUB SERPL-MCNC: 0.4 MG/DL (ref 0.2–1.2)
BUN SERPL-MCNC: 10 MG/DL (ref 7–18.7)
CA-I BLD-SCNC: 1.12 MMOL/L (ref 1.12–1.32)
CALCIUM SERPL-MCNC: 9.6 MG/DL (ref 7.8–10.44)
CHLORIDE SERPL-SCNC: 103 MMOL/L (ref 98–107)
CHLORIDE SERPL-SCNC: 106 MMOL/L (ref 98–113)
CK MB SERPL-MCNC: 3.3 NG/ML (ref 0–6.6)
CK SERPL-CCNC: 243 U/L (ref 29–168)
CO2 BLDV CALC-SCNC: 24.4 MMOL/L (ref 1–85)
CO2 SERPL-SCNC: 22 MMOL/L (ref 22–29)
CO2 TENSION (PVCO2): 33.5 MMHG (ref 41–51)
CREAT CL PREDICTED SERPL C-G-VRATE: 0 ML/MIN (ref 70–130)
GLOBULIN SER CALC-MCNC: 3.5 G/DL (ref 2.4–3.5)
GLUCOSE SERPL-MCNC: 100 MG/DL (ref 70–105)
HCT VFR BLD CALC: 46 % (ref 36–52)
HEMOGLOBIN - CALC: 15.5 G/DL (ref 12–18)
HGB BLD-MCNC: 14.2 G/DL (ref 12–16)
MCH RBC QN AUTO: 28.6 PG (ref 27–31)
MCV RBC AUTO: 87.9 FL (ref 78–98)
MDIFF COMPLETE?: YES
O2 TENSION (PVO2): 167.3 MMHG (ref 35–45)
PLATELET # BLD AUTO: 276 THOU/UL (ref 130–400)
PLATELET BLD QL SMEAR: (no result)
POTASSIUM SERPL-SCNC: 3.6 MMOL/L (ref 3.4–4.7)
POTASSIUM SERPL-SCNC: 4.2 MMOL/L (ref 3.5–5.1)
RBC # BLD AUTO: 4.99 MILL/UL (ref 4.2–5.4)
SAO2 % BLDV FROM PO2: 99.6 % (ref 94–98)
SODIUM SERPL-SCNC: 137 MMOL/L (ref 136–145)
SODIUM SERPL-SCNC: 141 MMOL/L (ref 138–145)
TROPONIN I SERPL DL<=0.01 NG/ML-MCNC: (no result) NG/ML (ref ?–0.03)
WBC # BLD AUTO: 17.1 THOU/UL (ref 4.8–10.8)

## 2018-09-24 PROCEDURE — 96375 TX/PRO/DX INJ NEW DRUG ADDON: CPT

## 2018-09-24 PROCEDURE — 82803 BLOOD GASES ANY COMBINATION: CPT

## 2018-09-24 PROCEDURE — 87804 INFLUENZA ASSAY W/OPTIC: CPT

## 2018-09-24 PROCEDURE — 96367 TX/PROPH/DG ADDL SEQ IV INF: CPT

## 2018-09-24 PROCEDURE — 71045 X-RAY EXAM CHEST 1 VIEW: CPT

## 2018-09-24 PROCEDURE — 82330 ASSAY OF CALCIUM: CPT

## 2018-09-24 PROCEDURE — 80053 COMPREHEN METABOLIC PANEL: CPT

## 2018-09-24 PROCEDURE — 93005 ELECTROCARDIOGRAM TRACING: CPT

## 2018-09-24 PROCEDURE — 84484 ASSAY OF TROPONIN QUANT: CPT

## 2018-09-24 PROCEDURE — 85025 COMPLETE CBC W/AUTO DIFF WBC: CPT

## 2018-09-24 PROCEDURE — 96365 THER/PROPH/DIAG IV INF INIT: CPT

## 2018-09-24 PROCEDURE — 82550 ASSAY OF CK (CPK): CPT

## 2018-09-24 PROCEDURE — 82553 CREATINE MB FRACTION: CPT

## 2018-10-22 ENCOUNTER — HOSPITAL ENCOUNTER (INPATIENT)
Dept: HOSPITAL 92 - ERS | Age: 48
LOS: 3 days | Discharge: HOME | DRG: 192 | End: 2018-10-25
Attending: INTERNAL MEDICINE | Admitting: INTERNAL MEDICINE
Payer: COMMERCIAL

## 2018-10-22 VITALS — BODY MASS INDEX: 26.9 KG/M2

## 2018-10-22 DIAGNOSIS — Z88.0: ICD-10-CM

## 2018-10-22 DIAGNOSIS — M79.7: ICD-10-CM

## 2018-10-22 DIAGNOSIS — F44.9: ICD-10-CM

## 2018-10-22 DIAGNOSIS — J38.3: ICD-10-CM

## 2018-10-22 DIAGNOSIS — Z88.8: ICD-10-CM

## 2018-10-22 DIAGNOSIS — F17.210: ICD-10-CM

## 2018-10-22 DIAGNOSIS — G47.33: ICD-10-CM

## 2018-10-22 DIAGNOSIS — Z79.899: ICD-10-CM

## 2018-10-22 DIAGNOSIS — G25.81: ICD-10-CM

## 2018-10-22 DIAGNOSIS — F32.9: ICD-10-CM

## 2018-10-22 DIAGNOSIS — F41.9: ICD-10-CM

## 2018-10-22 DIAGNOSIS — J44.1: Primary | ICD-10-CM

## 2018-10-22 LAB
ALBUMIN SERPL BCG-MCNC: 4.4 G/DL (ref 3.5–5)
ALP SERPL-CCNC: 61 U/L (ref 40–150)
ALT SERPL W P-5'-P-CCNC: 20 U/L (ref 8–55)
ANION GAP SERPL CALC-SCNC: 15 MMOL/L (ref 10–20)
AST SERPL-CCNC: 19 U/L (ref 5–34)
BASOPHILS # BLD AUTO: 0 THOU/UL (ref 0–0.2)
BASOPHILS NFR BLD AUTO: 0.2 % (ref 0–1)
BILIRUB SERPL-MCNC: 0.4 MG/DL (ref 0.2–1.2)
BUN SERPL-MCNC: 18 MG/DL (ref 7–18.7)
CALCIUM SERPL-MCNC: 9.9 MG/DL (ref 7.8–10.44)
CHLORIDE SERPL-SCNC: 108 MMOL/L (ref 98–107)
CO2 SERPL-SCNC: 21 MMOL/L (ref 22–29)
CREAT CL PREDICTED SERPL C-G-VRATE: 0 ML/MIN (ref 70–130)
EOSINOPHIL # BLD AUTO: 0 THOU/UL (ref 0–0.7)
EOSINOPHIL NFR BLD AUTO: 0.1 % (ref 0–10)
GLOBULIN SER CALC-MCNC: 3.6 G/DL (ref 2.4–3.5)
GLUCOSE SERPL-MCNC: 128 MG/DL (ref 70–105)
HGB BLD-MCNC: 14.1 G/DL (ref 12–16)
LYMPHOCYTES # BLD: 1.2 THOU/UL (ref 1.2–3.4)
LYMPHOCYTES NFR BLD AUTO: 7.3 % (ref 21–51)
MCH RBC QN AUTO: 28 PG (ref 27–31)
MCV RBC AUTO: 87.2 FL (ref 78–98)
MONOCYTES # BLD AUTO: 0.4 THOU/UL (ref 0.11–0.59)
MONOCYTES NFR BLD AUTO: 2.7 % (ref 0–10)
NEUTROPHILS # BLD AUTO: 14.2 THOU/UL (ref 1.4–6.5)
NEUTROPHILS NFR BLD AUTO: 89.7 % (ref 42–75)
PLATELET # BLD AUTO: 295 THOU/UL (ref 130–400)
POTASSIUM SERPL-SCNC: 4.1 MMOL/L (ref 3.5–5.1)
RBC # BLD AUTO: 5.04 MILL/UL (ref 4.2–5.4)
SODIUM SERPL-SCNC: 140 MMOL/L (ref 136–145)
WBC # BLD AUTO: 15.9 THOU/UL (ref 4.8–10.8)

## 2018-10-22 PROCEDURE — 85025 COMPLETE CBC W/AUTO DIFF WBC: CPT

## 2018-10-22 PROCEDURE — 93005 ELECTROCARDIOGRAM TRACING: CPT

## 2018-10-22 PROCEDURE — 87804 INFLUENZA ASSAY W/OPTIC: CPT

## 2018-10-22 PROCEDURE — 80053 COMPREHEN METABOLIC PANEL: CPT

## 2018-10-22 PROCEDURE — 94640 AIRWAY INHALATION TREATMENT: CPT

## 2018-10-22 PROCEDURE — 94660 CPAP INITIATION&MGMT: CPT

## 2018-10-22 PROCEDURE — 71045 X-RAY EXAM CHEST 1 VIEW: CPT

## 2018-10-22 PROCEDURE — 96365 THER/PROPH/DIAG IV INF INIT: CPT

## 2018-10-22 PROCEDURE — 96375 TX/PRO/DX INJ NEW DRUG ADDON: CPT

## 2018-10-22 PROCEDURE — 96367 TX/PROPH/DG ADDL SEQ IV INF: CPT

## 2018-10-22 RX ADMIN — Medication SCH ML: at 20:42

## 2018-10-22 RX ADMIN — HYDROCODONE BITARTRATE AND ACETAMINOPHEN PRN TAB: 5; 325 TABLET ORAL at 20:40

## 2018-10-23 RX ADMIN — Medication SCH ML: at 20:52

## 2018-10-23 RX ADMIN — Medication SCH ML: at 09:12

## 2018-10-23 RX ADMIN — HYDROCODONE BITARTRATE AND ACETAMINOPHEN PRN TAB: 5; 325 TABLET ORAL at 09:15

## 2018-10-23 RX ADMIN — HYDROCODONE BITARTRATE AND ACETAMINOPHEN PRN TAB: 5; 325 TABLET ORAL at 20:51

## 2018-10-24 VITALS — TEMPERATURE: 98.5 F | SYSTOLIC BLOOD PRESSURE: 133 MMHG | DIASTOLIC BLOOD PRESSURE: 76 MMHG

## 2018-10-24 RX ADMIN — HYDROCODONE BITARTRATE AND ACETAMINOPHEN PRN TAB: 5; 325 TABLET ORAL at 15:23

## 2018-10-24 RX ADMIN — Medication PRN ML: at 12:04

## 2018-10-24 RX ADMIN — HYDROCODONE BITARTRATE AND ACETAMINOPHEN PRN TAB: 5; 325 TABLET ORAL at 21:56

## 2018-10-24 RX ADMIN — Medication SCH: at 21:58

## 2018-10-24 RX ADMIN — Medication SCH ML: at 08:32

## 2018-10-25 RX ADMIN — Medication SCH: at 10:45

## 2018-10-25 RX ADMIN — Medication PRN ML: at 11:54

## 2018-12-05 ENCOUNTER — HOSPITAL ENCOUNTER (INPATIENT)
Dept: HOSPITAL 92 - ERS | Age: 48
LOS: 1 days | Discharge: HOME | DRG: 192 | End: 2018-12-06
Attending: FAMILY MEDICINE | Admitting: FAMILY MEDICINE
Payer: COMMERCIAL

## 2018-12-05 VITALS — BODY MASS INDEX: 27.6 KG/M2

## 2018-12-05 DIAGNOSIS — G47.33: ICD-10-CM

## 2018-12-05 DIAGNOSIS — G25.81: ICD-10-CM

## 2018-12-05 DIAGNOSIS — F41.9: ICD-10-CM

## 2018-12-05 DIAGNOSIS — J44.1: Primary | ICD-10-CM

## 2018-12-05 DIAGNOSIS — J38.3: ICD-10-CM

## 2018-12-05 LAB
ALBUMIN SERPL BCG-MCNC: 3.9 G/DL (ref 3.5–5)
ALP SERPL-CCNC: 61 U/L (ref 40–150)
ALT SERPL W P-5'-P-CCNC: 15 U/L (ref 8–55)
ANALYZER IN CARDIO: (no result)
ANION GAP SERPL CALC-SCNC: 11 MMOL/L (ref 10–20)
AST SERPL-CCNC: 20 U/L (ref 5–34)
BASE EXCESS STD BLDA CALC-SCNC: -0.4 MEQ/L
BASOPHILS # BLD AUTO: 0.1 THOU/UL (ref 0–0.2)
BASOPHILS NFR BLD AUTO: 0.8 % (ref 0–1)
BILIRUB SERPL-MCNC: 0.5 MG/DL (ref 0.2–1.2)
BUN SERPL-MCNC: 10 MG/DL (ref 7–18.7)
CA-I BLDA-SCNC: 1.08 MMOL/L (ref 1.12–1.3)
CALCIUM SERPL-MCNC: 9.3 MG/DL (ref 7.8–10.44)
CHLORIDE SERPL-SCNC: 108 MMOL/L (ref 98–107)
CO2 SERPL-SCNC: 24 MMOL/L (ref 22–29)
CREAT CL PREDICTED SERPL C-G-VRATE: 0 ML/MIN (ref 70–130)
EOSINOPHIL # BLD AUTO: 0.4 THOU/UL (ref 0–0.7)
EOSINOPHIL NFR BLD AUTO: 3 % (ref 0–10)
GLOBULIN SER CALC-MCNC: 3.3 G/DL (ref 2.4–3.5)
GLUCOSE SERPL-MCNC: 102 MG/DL (ref 70–105)
HCO3 BLDA-SCNC: 23.2 MEQ/L (ref 22–28)
HCT VFR BLDA CALC: 42 % (ref 36–47)
HGB BLD-MCNC: 13.9 G/DL (ref 12–16)
HGB BLDA-MCNC: 14.4 G/DL (ref 12–16)
LYMPHOCYTES # BLD: 3 THOU/UL (ref 1.2–3.4)
LYMPHOCYTES NFR BLD AUTO: 24.9 % (ref 21–51)
MCH RBC QN AUTO: 28.7 PG (ref 27–31)
MCV RBC AUTO: 87.8 FL (ref 78–98)
MONOCYTES # BLD AUTO: 1 THOU/UL (ref 0.11–0.59)
MONOCYTES NFR BLD AUTO: 8.2 % (ref 0–10)
NEUTROPHILS # BLD AUTO: 7.5 THOU/UL (ref 1.4–6.5)
NEUTROPHILS NFR BLD AUTO: 63.2 % (ref 42–75)
O2 A-A PPRESDIFF RESPIRATORY: 42.95 MM[HG] (ref 0–20)
PCO2 BLDA: 35.1 MMHG (ref 35–45)
PH BLDA: 7.44 [PH] (ref 7.35–7.45)
PLATELET # BLD AUTO: 207 THOU/UL (ref 130–400)
PO2 BLDA: 62.9 MMHG (ref 80–100)
POTASSIUM BLD-SCNC: 3.71 MMOL/L (ref 3.7–5.3)
POTASSIUM SERPL-SCNC: 4 MMOL/L (ref 3.5–5.1)
RBC # BLD AUTO: 4.83 MILL/UL (ref 4.2–5.4)
SODIUM SERPL-SCNC: 139 MMOL/L (ref 136–145)
SPECIMEN DRAWN FROM PATIENT: (no result)
WBC # BLD AUTO: 11.9 THOU/UL (ref 4.8–10.8)

## 2018-12-05 PROCEDURE — 80048 BASIC METABOLIC PNL TOTAL CA: CPT

## 2018-12-05 PROCEDURE — 71045 X-RAY EXAM CHEST 1 VIEW: CPT

## 2018-12-05 PROCEDURE — 85025 COMPLETE CBC W/AUTO DIFF WBC: CPT

## 2018-12-05 PROCEDURE — 94640 AIRWAY INHALATION TREATMENT: CPT

## 2018-12-05 PROCEDURE — 94644 CONT INHLJ TX 1ST HOUR: CPT

## 2018-12-05 PROCEDURE — 93005 ELECTROCARDIOGRAM TRACING: CPT

## 2018-12-05 PROCEDURE — 36415 COLL VENOUS BLD VENIPUNCTURE: CPT

## 2018-12-05 PROCEDURE — 82805 BLOOD GASES W/O2 SATURATION: CPT

## 2018-12-05 PROCEDURE — 80053 COMPREHEN METABOLIC PANEL: CPT

## 2018-12-05 RX ADMIN — ACETAMINOPHEN AND CODEINE PHOSPHATE PRN TAB: 300; 30 TABLET ORAL at 21:44

## 2018-12-06 VITALS — SYSTOLIC BLOOD PRESSURE: 123 MMHG | DIASTOLIC BLOOD PRESSURE: 75 MMHG

## 2018-12-06 VITALS — TEMPERATURE: 98.5 F

## 2018-12-06 LAB
ANALYZER IN CARDIO: (no result)
ANION GAP SERPL CALC-SCNC: 12 MMOL/L (ref 10–20)
BASE EXCESS STD BLDA CALC-SCNC: -5.3 MEQ/L
BASOPHILS # BLD AUTO: 0 THOU/UL (ref 0–0.2)
BASOPHILS NFR BLD AUTO: 0.1 % (ref 0–1)
BUN SERPL-MCNC: 11 MG/DL (ref 7–18.7)
CA-I BLDA-SCNC: 1.19 MMOL/L (ref 1.12–1.3)
CALCIUM SERPL-MCNC: 9.2 MG/DL (ref 7.8–10.44)
CHLORIDE SERPL-SCNC: 110 MMOL/L (ref 98–107)
CO2 SERPL-SCNC: 21 MMOL/L (ref 22–29)
CREAT CL PREDICTED SERPL C-G-VRATE: 124 ML/MIN (ref 70–130)
EOSINOPHIL # BLD AUTO: 0 THOU/UL (ref 0–0.7)
EOSINOPHIL NFR BLD AUTO: 0.1 % (ref 0–10)
GLUCOSE SERPL-MCNC: 121 MG/DL (ref 70–105)
HCO3 BLDA-SCNC: 19.6 MEQ/L (ref 22–28)
HCT VFR BLDA CALC: 43 % (ref 36–47)
HGB BLD-MCNC: 13.6 G/DL (ref 12–16)
HGB BLDA-MCNC: 14.6 G/DL (ref 12–16)
LYMPHOCYTES # BLD: 1.3 THOU/UL (ref 1.2–3.4)
LYMPHOCYTES NFR BLD AUTO: 8.3 % (ref 21–51)
MCH RBC QN AUTO: 28.4 PG (ref 27–31)
MCV RBC AUTO: 88.3 FL (ref 78–98)
MONOCYTES # BLD AUTO: 0.9 THOU/UL (ref 0.11–0.59)
MONOCYTES NFR BLD AUTO: 6.2 % (ref 0–10)
NEUTROPHILS # BLD AUTO: 13 THOU/UL (ref 1.4–6.5)
NEUTROPHILS NFR BLD AUTO: 85.4 % (ref 42–75)
PCO2 BLDA: 36.2 MMHG (ref 35–45)
PH BLDA: 7.35 [PH] (ref 7.35–7.45)
PLATELET # BLD AUTO: 234 THOU/UL (ref 130–400)
PO2 BLDA: 95.2 MMHG (ref 80–100)
POTASSIUM BLD-SCNC: 3.99 MMOL/L (ref 3.7–5.3)
POTASSIUM SERPL-SCNC: 4 MMOL/L (ref 3.5–5.1)
RBC # BLD AUTO: 4.77 MILL/UL (ref 4.2–5.4)
SODIUM SERPL-SCNC: 139 MMOL/L (ref 136–145)
SPECIMEN DRAWN FROM PATIENT: (no result)
WBC # BLD AUTO: 15.2 THOU/UL (ref 4.8–10.8)

## 2018-12-06 RX ADMIN — ACETAMINOPHEN AND CODEINE PHOSPHATE PRN TAB: 300; 30 TABLET ORAL at 08:58

## 2018-12-13 ENCOUNTER — HOSPITAL ENCOUNTER (EMERGENCY)
Dept: HOSPITAL 92 - ERS | Age: 48
Discharge: HOME | End: 2018-12-13
Payer: COMMERCIAL

## 2018-12-13 DIAGNOSIS — F17.210: ICD-10-CM

## 2018-12-13 DIAGNOSIS — J44.1: Primary | ICD-10-CM

## 2018-12-13 DIAGNOSIS — F41.9: ICD-10-CM

## 2018-12-13 LAB
ALBUMIN SERPL BCG-MCNC: 4 G/DL (ref 3.5–5)
ALP SERPL-CCNC: 62 U/L (ref 40–150)
ALT SERPL W P-5'-P-CCNC: 14 U/L (ref 8–55)
ANION GAP SERPL CALC-SCNC: 14 MMOL/L (ref 10–20)
AST SERPL-CCNC: 15 U/L (ref 5–34)
BASOPHILS # BLD AUTO: 0.1 THOU/UL (ref 0–0.2)
BASOPHILS NFR BLD AUTO: 1.2 % (ref 0–1)
BILIRUB SERPL-MCNC: 0.5 MG/DL (ref 0.2–1.2)
BUN SERPL-MCNC: 12 MG/DL (ref 7–18.7)
CALCIUM SERPL-MCNC: 9.5 MG/DL (ref 7.8–10.44)
CHLORIDE SERPL-SCNC: 105 MMOL/L (ref 98–107)
CO2 SERPL-SCNC: 22 MMOL/L (ref 22–29)
CREAT CL PREDICTED SERPL C-G-VRATE: 0 ML/MIN (ref 70–130)
EOSINOPHIL # BLD AUTO: 0.5 THOU/UL (ref 0–0.7)
EOSINOPHIL NFR BLD AUTO: 4.3 % (ref 0–10)
GLOBULIN SER CALC-MCNC: 3.4 G/DL (ref 2.4–3.5)
GLUCOSE SERPL-MCNC: 118 MG/DL (ref 70–105)
HGB BLD-MCNC: 14 G/DL (ref 12–16)
LYMPHOCYTES # BLD: 3 THOU/UL (ref 1.2–3.4)
LYMPHOCYTES NFR BLD AUTO: 23.6 % (ref 21–51)
MCH RBC QN AUTO: 29.1 PG (ref 27–31)
MCV RBC AUTO: 87.8 FL (ref 78–98)
MONOCYTES # BLD AUTO: 1.3 THOU/UL (ref 0.11–0.59)
MONOCYTES NFR BLD AUTO: 10 % (ref 0–10)
NEUTROPHILS # BLD AUTO: 7.7 THOU/UL (ref 1.4–6.5)
NEUTROPHILS NFR BLD AUTO: 61 % (ref 42–75)
PLATELET # BLD AUTO: 264 THOU/UL (ref 130–400)
POTASSIUM SERPL-SCNC: 3.7 MMOL/L (ref 3.5–5.1)
RBC # BLD AUTO: 4.81 MILL/UL (ref 4.2–5.4)
SODIUM SERPL-SCNC: 137 MMOL/L (ref 136–145)
WBC # BLD AUTO: 12.7 THOU/UL (ref 4.8–10.8)

## 2018-12-13 PROCEDURE — 71045 X-RAY EXAM CHEST 1 VIEW: CPT

## 2018-12-13 PROCEDURE — 94640 AIRWAY INHALATION TREATMENT: CPT

## 2018-12-13 PROCEDURE — 85025 COMPLETE CBC W/AUTO DIFF WBC: CPT

## 2018-12-13 PROCEDURE — 96374 THER/PROPH/DIAG INJ IV PUSH: CPT

## 2018-12-13 PROCEDURE — 93005 ELECTROCARDIOGRAM TRACING: CPT

## 2018-12-13 PROCEDURE — 85379 FIBRIN DEGRADATION QUANT: CPT

## 2018-12-13 PROCEDURE — 83880 ASSAY OF NATRIURETIC PEPTIDE: CPT

## 2018-12-13 PROCEDURE — 84484 ASSAY OF TROPONIN QUANT: CPT

## 2018-12-13 PROCEDURE — 80053 COMPREHEN METABOLIC PANEL: CPT

## 2018-12-25 ENCOUNTER — HOSPITAL ENCOUNTER (OUTPATIENT)
Dept: HOSPITAL 92 - ERS | Age: 48
Setting detail: OBSERVATION
LOS: 1 days | Discharge: HOME | End: 2018-12-26
Attending: FAMILY MEDICINE | Admitting: FAMILY MEDICINE
Payer: COMMERCIAL

## 2018-12-25 VITALS — BODY MASS INDEX: 27.8 KG/M2

## 2018-12-25 DIAGNOSIS — R79.89: ICD-10-CM

## 2018-12-25 DIAGNOSIS — R07.89: ICD-10-CM

## 2018-12-25 DIAGNOSIS — F17.210: ICD-10-CM

## 2018-12-25 DIAGNOSIS — J44.1: ICD-10-CM

## 2018-12-25 DIAGNOSIS — J38.3: Primary | ICD-10-CM

## 2018-12-25 DIAGNOSIS — Z88.5: ICD-10-CM

## 2018-12-25 DIAGNOSIS — Z88.1: ICD-10-CM

## 2018-12-25 DIAGNOSIS — Z88.0: ICD-10-CM

## 2018-12-25 DIAGNOSIS — Z79.899: ICD-10-CM

## 2018-12-25 DIAGNOSIS — G47.33: ICD-10-CM

## 2018-12-25 DIAGNOSIS — M79.7: ICD-10-CM

## 2018-12-25 DIAGNOSIS — Z88.8: ICD-10-CM

## 2018-12-25 LAB
ALBUMIN SERPL BCG-MCNC: 4.1 G/DL (ref 3.5–5)
ALP SERPL-CCNC: 58 U/L (ref 40–150)
ALT SERPL W P-5'-P-CCNC: 14 U/L (ref 8–55)
ANION GAP SERPL CALC-SCNC: 15 MMOL/L (ref 10–20)
AST SERPL-CCNC: 13 U/L (ref 5–34)
BILIRUB SERPL-MCNC: 0.4 MG/DL (ref 0.2–1.2)
BUN SERPL-MCNC: 16 MG/DL (ref 7–18.7)
CALCIUM SERPL-MCNC: 9.4 MG/DL (ref 7.8–10.44)
CHLORIDE SERPL-SCNC: 105 MMOL/L (ref 98–107)
CK SERPL-CCNC: 151 U/L (ref 29–168)
CO2 SERPL-SCNC: 21 MMOL/L (ref 22–29)
CREAT CL PREDICTED SERPL C-G-VRATE: 0 ML/MIN (ref 70–130)
GLOBULIN SER CALC-MCNC: 3.3 G/DL (ref 2.4–3.5)
GLUCOSE SERPL-MCNC: 139 MG/DL (ref 70–105)
HGB BLD-MCNC: 13.6 G/DL (ref 12–16)
MCH RBC QN AUTO: 28 PG (ref 27–31)
MCV RBC AUTO: 86.8 FL (ref 78–98)
MDIFF COMPLETE?: YES
PLATELET # BLD AUTO: 286 THOU/UL (ref 130–400)
PLATELET BLD QL SMEAR: (no result)
POTASSIUM SERPL-SCNC: 4.2 MMOL/L (ref 3.5–5.1)
RBC # BLD AUTO: 4.85 MILL/UL (ref 4.2–5.4)
SODIUM SERPL-SCNC: 137 MMOL/L (ref 136–145)
WBC # BLD AUTO: 22.9 THOU/UL (ref 4.8–10.8)

## 2018-12-25 PROCEDURE — 93005 ELECTROCARDIOGRAM TRACING: CPT

## 2018-12-25 PROCEDURE — 96375 TX/PRO/DX INJ NEW DRUG ADDON: CPT

## 2018-12-25 PROCEDURE — 96372 THER/PROPH/DIAG INJ SC/IM: CPT

## 2018-12-25 PROCEDURE — 96365 THER/PROPH/DIAG IV INF INIT: CPT

## 2018-12-25 PROCEDURE — 82550 ASSAY OF CK (CPK): CPT

## 2018-12-25 PROCEDURE — G0378 HOSPITAL OBSERVATION PER HR: HCPCS

## 2018-12-25 PROCEDURE — 94660 CPAP INITIATION&MGMT: CPT

## 2018-12-25 PROCEDURE — 71045 X-RAY EXAM CHEST 1 VIEW: CPT

## 2018-12-25 PROCEDURE — 94640 AIRWAY INHALATION TREATMENT: CPT

## 2018-12-25 PROCEDURE — 80053 COMPREHEN METABOLIC PANEL: CPT

## 2018-12-25 PROCEDURE — 85025 COMPLETE CBC W/AUTO DIFF WBC: CPT

## 2018-12-25 PROCEDURE — 96376 TX/PRO/DX INJ SAME DRUG ADON: CPT

## 2018-12-25 PROCEDURE — 84484 ASSAY OF TROPONIN QUANT: CPT

## 2018-12-25 PROCEDURE — 83880 ASSAY OF NATRIURETIC PEPTIDE: CPT

## 2018-12-26 VITALS — DIASTOLIC BLOOD PRESSURE: 66 MMHG | SYSTOLIC BLOOD PRESSURE: 133 MMHG | TEMPERATURE: 98.4 F

## 2019-01-09 ENCOUNTER — HOSPITAL ENCOUNTER (EMERGENCY)
Dept: HOSPITAL 92 - ERS | Age: 49
Discharge: HOME | End: 2019-01-09
Payer: COMMERCIAL

## 2019-01-09 DIAGNOSIS — F17.210: ICD-10-CM

## 2019-01-09 DIAGNOSIS — Z79.891: ICD-10-CM

## 2019-01-09 DIAGNOSIS — J44.1: Primary | ICD-10-CM

## 2019-01-09 DIAGNOSIS — Z79.899: ICD-10-CM

## 2019-01-09 DIAGNOSIS — F41.9: ICD-10-CM

## 2019-01-09 LAB
ALBUMIN SERPL BCG-MCNC: 4.3 G/DL (ref 3.5–5)
ALP SERPL-CCNC: 64 U/L (ref 40–150)
ALT SERPL W P-5'-P-CCNC: 20 U/L (ref 8–55)
ANION GAP SERPL CALC-SCNC: 17 MMOL/L (ref 10–20)
AST SERPL-CCNC: 20 U/L (ref 5–34)
BASOPHILS # BLD AUTO: 0.1 THOU/UL (ref 0–0.2)
BASOPHILS NFR BLD AUTO: 0.9 % (ref 0–1)
BILIRUB SERPL-MCNC: 0.7 MG/DL (ref 0.2–1.2)
BUN SERPL-MCNC: 12 MG/DL (ref 7–18.7)
CALCIUM SERPL-MCNC: 9.4 MG/DL (ref 7.8–10.44)
CHLORIDE SERPL-SCNC: 107 MMOL/L (ref 98–107)
CO2 SERPL-SCNC: 19 MMOL/L (ref 22–29)
CREAT CL PREDICTED SERPL C-G-VRATE: 0 ML/MIN (ref 70–130)
EOSINOPHIL # BLD AUTO: 0.4 THOU/UL (ref 0–0.7)
EOSINOPHIL NFR BLD AUTO: 3.4 % (ref 0–10)
GLOBULIN SER CALC-MCNC: 3 G/DL (ref 2.4–3.5)
GLUCOSE SERPL-MCNC: 98 MG/DL (ref 70–105)
HGB BLD-MCNC: 14.8 G/DL (ref 12–16)
LYMPHOCYTES # BLD: 3.4 THOU/UL (ref 1.2–3.4)
LYMPHOCYTES NFR BLD AUTO: 26.6 % (ref 21–51)
MCH RBC QN AUTO: 28.2 PG (ref 27–31)
MCV RBC AUTO: 87.9 FL (ref 78–98)
MONOCYTES # BLD AUTO: 0.8 THOU/UL (ref 0.11–0.59)
MONOCYTES NFR BLD AUTO: 6.5 % (ref 0–10)
NEUTROPHILS # BLD AUTO: 8.1 THOU/UL (ref 1.4–6.5)
NEUTROPHILS NFR BLD AUTO: 62.7 % (ref 42–75)
PLATELET # BLD AUTO: 258 THOU/UL (ref 130–400)
POTASSIUM SERPL-SCNC: 4 MMOL/L (ref 3.5–5.1)
RBC # BLD AUTO: 5.25 MILL/UL (ref 4.2–5.4)
SODIUM SERPL-SCNC: 139 MMOL/L (ref 136–145)
WBC # BLD AUTO: 13 THOU/UL (ref 4.8–10.8)

## 2019-01-09 PROCEDURE — 85025 COMPLETE CBC W/AUTO DIFF WBC: CPT

## 2019-01-09 PROCEDURE — 93005 ELECTROCARDIOGRAM TRACING: CPT

## 2019-01-09 PROCEDURE — 36415 COLL VENOUS BLD VENIPUNCTURE: CPT

## 2019-01-09 PROCEDURE — 71045 X-RAY EXAM CHEST 1 VIEW: CPT

## 2019-01-09 PROCEDURE — 84484 ASSAY OF TROPONIN QUANT: CPT

## 2019-01-09 PROCEDURE — 80053 COMPREHEN METABOLIC PANEL: CPT

## 2019-01-09 PROCEDURE — 94640 AIRWAY INHALATION TREATMENT: CPT

## 2019-02-06 ENCOUNTER — HOSPITAL ENCOUNTER (INPATIENT)
Dept: HOSPITAL 92 - ERS | Age: 49
LOS: 1 days | Discharge: HOME | DRG: 192 | End: 2019-02-07
Attending: FAMILY MEDICINE | Admitting: FAMILY MEDICINE
Payer: COMMERCIAL

## 2019-02-06 VITALS — BODY MASS INDEX: 26.2 KG/M2

## 2019-02-06 DIAGNOSIS — Z88.0: ICD-10-CM

## 2019-02-06 DIAGNOSIS — F41.9: ICD-10-CM

## 2019-02-06 DIAGNOSIS — D72.829: ICD-10-CM

## 2019-02-06 DIAGNOSIS — Z90.710: ICD-10-CM

## 2019-02-06 DIAGNOSIS — G25.81: ICD-10-CM

## 2019-02-06 DIAGNOSIS — J38.3: ICD-10-CM

## 2019-02-06 DIAGNOSIS — F51.04: ICD-10-CM

## 2019-02-06 DIAGNOSIS — J44.1: Primary | ICD-10-CM

## 2019-02-06 DIAGNOSIS — N18.2: ICD-10-CM

## 2019-02-06 DIAGNOSIS — Z87.891: ICD-10-CM

## 2019-02-06 DIAGNOSIS — Z90.49: ICD-10-CM

## 2019-02-06 DIAGNOSIS — Z88.8: ICD-10-CM

## 2019-02-06 DIAGNOSIS — Z90.89: ICD-10-CM

## 2019-02-06 DIAGNOSIS — M79.7: ICD-10-CM

## 2019-02-06 DIAGNOSIS — E16.2: ICD-10-CM

## 2019-02-06 DIAGNOSIS — Z79.899: ICD-10-CM

## 2019-02-06 DIAGNOSIS — Z88.1: ICD-10-CM

## 2019-02-06 LAB
ALBUMIN SERPL BCG-MCNC: 4.1 G/DL (ref 3.5–5)
ALP SERPL-CCNC: 64 U/L (ref 40–150)
ALT SERPL W P-5'-P-CCNC: 15 U/L (ref 8–55)
ANION GAP SERPL CALC-SCNC: 16 MMOL/L (ref 10–20)
AST SERPL-CCNC: 20 U/L (ref 5–34)
BASOPHILS # BLD AUTO: 0.1 THOU/UL (ref 0–0.2)
BASOPHILS NFR BLD AUTO: 0.9 % (ref 0–1)
BILIRUB SERPL-MCNC: 0.4 MG/DL (ref 0.2–1.2)
BUN SERPL-MCNC: 15 MG/DL (ref 7–18.7)
CALCIUM SERPL-MCNC: 9.6 MG/DL (ref 7.8–10.44)
CHLORIDE SERPL-SCNC: 106 MMOL/L (ref 98–107)
CK SERPL-CCNC: 333 U/L (ref 29–168)
CO2 SERPL-SCNC: 19 MMOL/L (ref 22–29)
CREAT CL PREDICTED SERPL C-G-VRATE: 0 ML/MIN (ref 70–130)
EOSINOPHIL # BLD AUTO: 0.9 THOU/UL (ref 0–0.7)
EOSINOPHIL NFR BLD AUTO: 5.6 % (ref 0–10)
GLOBULIN SER CALC-MCNC: 3.5 G/DL (ref 2.4–3.5)
GLUCOSE SERPL-MCNC: 100 MG/DL (ref 70–105)
HGB BLD-MCNC: 14.7 G/DL (ref 12–16)
LIPASE SERPL-CCNC: 34 U/L (ref 8–78)
LYMPHOCYTES # BLD: 3.7 THOU/UL (ref 1.2–3.4)
LYMPHOCYTES NFR BLD AUTO: 24 % (ref 21–51)
MCH RBC QN AUTO: 28.2 PG (ref 27–31)
MCV RBC AUTO: 89.3 FL (ref 78–98)
MONOCYTES # BLD AUTO: 1.2 THOU/UL (ref 0.11–0.59)
MONOCYTES NFR BLD AUTO: 7.6 % (ref 0–10)
NEUTROPHILS # BLD AUTO: 9.5 THOU/UL (ref 1.4–6.5)
NEUTROPHILS NFR BLD AUTO: 62 % (ref 42–75)
PLATELET # BLD AUTO: 255 THOU/UL (ref 130–400)
POTASSIUM SERPL-SCNC: 4.1 MMOL/L (ref 3.5–5.1)
RBC # BLD AUTO: 5.21 MILL/UL (ref 4.2–5.4)
SODIUM SERPL-SCNC: 137 MMOL/L (ref 136–145)
WBC # BLD AUTO: 15.4 THOU/UL (ref 4.8–10.8)

## 2019-02-06 PROCEDURE — 36415 COLL VENOUS BLD VENIPUNCTURE: CPT

## 2019-02-06 PROCEDURE — 94640 AIRWAY INHALATION TREATMENT: CPT

## 2019-02-06 PROCEDURE — 83690 ASSAY OF LIPASE: CPT

## 2019-02-06 PROCEDURE — 93005 ELECTROCARDIOGRAM TRACING: CPT

## 2019-02-06 PROCEDURE — 94660 CPAP INITIATION&MGMT: CPT

## 2019-02-06 PROCEDURE — 87205 SMEAR GRAM STAIN: CPT

## 2019-02-06 PROCEDURE — 85025 COMPLETE CBC W/AUTO DIFF WBC: CPT

## 2019-02-06 PROCEDURE — 94644 CONT INHLJ TX 1ST HOUR: CPT

## 2019-02-06 PROCEDURE — 71045 X-RAY EXAM CHEST 1 VIEW: CPT

## 2019-02-06 PROCEDURE — 96375 TX/PRO/DX INJ NEW DRUG ADDON: CPT

## 2019-02-06 PROCEDURE — 96367 TX/PROPH/DG ADDL SEQ IV INF: CPT

## 2019-02-06 PROCEDURE — 80053 COMPREHEN METABOLIC PANEL: CPT

## 2019-02-06 PROCEDURE — 94760 N-INVAS EAR/PLS OXIMETRY 1: CPT

## 2019-02-06 PROCEDURE — 87070 CULTURE OTHR SPECIMN AEROBIC: CPT

## 2019-02-06 PROCEDURE — 84484 ASSAY OF TROPONIN QUANT: CPT

## 2019-02-06 PROCEDURE — 82550 ASSAY OF CK (CPK): CPT

## 2019-02-06 PROCEDURE — 96365 THER/PROPH/DIAG IV INF INIT: CPT

## 2019-02-06 PROCEDURE — 96361 HYDRATE IV INFUSION ADD-ON: CPT

## 2019-02-06 PROCEDURE — 36416 COLLJ CAPILLARY BLOOD SPEC: CPT

## 2019-02-07 VITALS — SYSTOLIC BLOOD PRESSURE: 122 MMHG | TEMPERATURE: 98.1 F | DIASTOLIC BLOOD PRESSURE: 69 MMHG

## 2019-02-07 LAB
ALBUMIN SERPL BCG-MCNC: 3.4 G/DL (ref 3.5–5)
ALP SERPL-CCNC: 51 U/L (ref 40–150)
ALT SERPL W P-5'-P-CCNC: 13 U/L (ref 8–55)
ANION GAP SERPL CALC-SCNC: 14 MMOL/L (ref 10–20)
AST SERPL-CCNC: 17 U/L (ref 5–34)
BASOPHILS # BLD AUTO: 0 THOU/UL (ref 0–0.2)
BASOPHILS NFR BLD AUTO: 0.1 % (ref 0–1)
BILIRUB SERPL-MCNC: 0.2 MG/DL (ref 0.2–1.2)
BUN SERPL-MCNC: 16 MG/DL (ref 7–18.7)
CALCIUM SERPL-MCNC: 8.5 MG/DL (ref 7.8–10.44)
CHLORIDE SERPL-SCNC: 109 MMOL/L (ref 98–107)
CO2 SERPL-SCNC: 20 MMOL/L (ref 22–29)
CREAT CL PREDICTED SERPL C-G-VRATE: 106 ML/MIN (ref 70–130)
EOSINOPHIL # BLD AUTO: 0 THOU/UL (ref 0–0.7)
EOSINOPHIL NFR BLD AUTO: 0.1 % (ref 0–10)
GLOBULIN SER CALC-MCNC: 3.1 G/DL (ref 2.4–3.5)
GLUCOSE SERPL-MCNC: 139 MG/DL (ref 70–105)
HGB BLD-MCNC: 12.5 G/DL (ref 12–16)
LYMPHOCYTES # BLD: 1 THOU/UL (ref 1.2–3.4)
LYMPHOCYTES NFR BLD AUTO: 8.7 % (ref 21–51)
MCH RBC QN AUTO: 29 PG (ref 27–31)
MCV RBC AUTO: 90 FL (ref 78–98)
MONOCYTES # BLD AUTO: 0.2 THOU/UL (ref 0.11–0.59)
MONOCYTES NFR BLD AUTO: 2 % (ref 0–10)
NEUTROPHILS # BLD AUTO: 9.7 THOU/UL (ref 1.4–6.5)
NEUTROPHILS NFR BLD AUTO: 89.1 % (ref 42–75)
PLATELET # BLD AUTO: 229 THOU/UL (ref 130–400)
POTASSIUM SERPL-SCNC: 4.5 MMOL/L (ref 3.5–5.1)
RBC # BLD AUTO: 4.33 MILL/UL (ref 4.2–5.4)
SODIUM SERPL-SCNC: 138 MMOL/L (ref 136–145)
WBC # BLD AUTO: 10.9 THOU/UL (ref 4.8–10.8)

## 2019-02-19 ENCOUNTER — HOSPITAL ENCOUNTER (EMERGENCY)
Dept: HOSPITAL 92 - ERS | Age: 49
Discharge: HOME | End: 2019-02-19
Payer: COMMERCIAL

## 2019-02-19 DIAGNOSIS — Z87.891: ICD-10-CM

## 2019-02-19 DIAGNOSIS — J38.2: Primary | ICD-10-CM

## 2019-02-19 DIAGNOSIS — Z79.891: ICD-10-CM

## 2019-02-19 DIAGNOSIS — Z79.899: ICD-10-CM

## 2019-02-19 DIAGNOSIS — F41.9: ICD-10-CM

## 2019-02-19 DIAGNOSIS — J44.9: ICD-10-CM

## 2019-02-19 LAB
ALBUMIN SERPL BCG-MCNC: 4.1 G/DL (ref 3.5–5)
ALP SERPL-CCNC: 55 U/L (ref 40–150)
ALT SERPL W P-5'-P-CCNC: 16 U/L (ref 8–55)
ANION GAP SERPL CALC-SCNC: 15 MMOL/L (ref 10–20)
AST SERPL-CCNC: 16 U/L (ref 5–34)
BASOPHILS # BLD AUTO: 0.1 THOU/UL (ref 0–0.2)
BASOPHILS NFR BLD AUTO: 0.9 % (ref 0–1)
BILIRUB SERPL-MCNC: 0.3 MG/DL (ref 0.2–1.2)
BUN SERPL-MCNC: 12 MG/DL (ref 7–18.7)
CALCIUM SERPL-MCNC: 9.5 MG/DL (ref 7.8–10.44)
CHLORIDE SERPL-SCNC: 105 MMOL/L (ref 98–107)
CO2 SERPL-SCNC: 25 MMOL/L (ref 22–29)
CREAT CL PREDICTED SERPL C-G-VRATE: 0 ML/MIN (ref 70–130)
EOSINOPHIL # BLD AUTO: 0.8 THOU/UL (ref 0–0.7)
EOSINOPHIL NFR BLD AUTO: 6.3 % (ref 0–10)
GLOBULIN SER CALC-MCNC: 3.2 G/DL (ref 2.4–3.5)
GLUCOSE SERPL-MCNC: 108 MG/DL (ref 70–105)
HGB BLD-MCNC: 14.7 G/DL (ref 12–16)
LYMPHOCYTES # BLD: 3.8 THOU/UL (ref 1.2–3.4)
LYMPHOCYTES NFR BLD AUTO: 31.1 % (ref 21–51)
MCH RBC QN AUTO: 28.6 PG (ref 27–31)
MCV RBC AUTO: 89.3 FL (ref 78–98)
MONOCYTES # BLD AUTO: 0.7 THOU/UL (ref 0.11–0.59)
MONOCYTES NFR BLD AUTO: 6.1 % (ref 0–10)
NEUTROPHILS # BLD AUTO: 6.7 THOU/UL (ref 1.4–6.5)
NEUTROPHILS NFR BLD AUTO: 55.7 % (ref 42–75)
PLATELET # BLD AUTO: 248 THOU/UL (ref 130–400)
POTASSIUM SERPL-SCNC: 3.7 MMOL/L (ref 3.5–5.1)
RBC # BLD AUTO: 5.14 MILL/UL (ref 4.2–5.4)
SODIUM SERPL-SCNC: 141 MMOL/L (ref 136–145)
WBC # BLD AUTO: 12 THOU/UL (ref 4.8–10.8)

## 2019-02-19 PROCEDURE — 36415 COLL VENOUS BLD VENIPUNCTURE: CPT

## 2019-02-19 PROCEDURE — 31505 DIAGNOSTIC LARYNGOSCOPY: CPT

## 2019-02-19 PROCEDURE — 85025 COMPLETE CBC W/AUTO DIFF WBC: CPT

## 2019-02-19 PROCEDURE — 71045 X-RAY EXAM CHEST 1 VIEW: CPT

## 2019-02-19 PROCEDURE — 84484 ASSAY OF TROPONIN QUANT: CPT

## 2019-02-19 PROCEDURE — 80053 COMPREHEN METABOLIC PANEL: CPT

## 2019-02-19 PROCEDURE — 94640 AIRWAY INHALATION TREATMENT: CPT

## 2019-02-19 PROCEDURE — 93005 ELECTROCARDIOGRAM TRACING: CPT

## 2019-03-06 ENCOUNTER — HOSPITAL ENCOUNTER (EMERGENCY)
Dept: HOSPITAL 92 - ERS | Age: 49
Discharge: HOME | End: 2019-03-06
Payer: COMMERCIAL

## 2019-03-06 DIAGNOSIS — Z87.891: ICD-10-CM

## 2019-03-06 DIAGNOSIS — J44.1: Primary | ICD-10-CM

## 2019-03-06 LAB
ALBUMIN SERPL BCG-MCNC: 4.3 G/DL (ref 3.5–5)
ALP SERPL-CCNC: 65 U/L (ref 40–150)
ALT SERPL W P-5'-P-CCNC: 23 U/L (ref 8–55)
ANION GAP SERPL CALC-SCNC: 14 MMOL/L (ref 10–20)
AST SERPL-CCNC: 17 U/L (ref 5–34)
BASOPHILS # BLD AUTO: 0.1 THOU/UL (ref 0–0.2)
BASOPHILS NFR BLD AUTO: 0.3 % (ref 0–1)
BILIRUB SERPL-MCNC: 0.6 MG/DL (ref 0.2–1.2)
BUN SERPL-MCNC: 15 MG/DL (ref 7–18.7)
CALCIUM SERPL-MCNC: 9.6 MG/DL (ref 7.8–10.44)
CHLORIDE SERPL-SCNC: 104 MMOL/L (ref 98–107)
CK SERPL-CCNC: 235 U/L (ref 29–168)
CO2 SERPL-SCNC: 23 MMOL/L (ref 22–29)
CREAT CL PREDICTED SERPL C-G-VRATE: 0 ML/MIN (ref 70–130)
EOSINOPHIL # BLD AUTO: 0 THOU/UL (ref 0–0.7)
EOSINOPHIL NFR BLD AUTO: 0.1 % (ref 0–10)
GLOBULIN SER CALC-MCNC: 3.2 G/DL (ref 2.4–3.5)
GLUCOSE SERPL-MCNC: 134 MG/DL (ref 70–105)
HGB BLD-MCNC: 14 G/DL (ref 12–16)
LIPASE SERPL-CCNC: 30 U/L (ref 8–78)
LYMPHOCYTES # BLD: 1 THOU/UL (ref 1.2–3.4)
LYMPHOCYTES NFR BLD AUTO: 5.8 % (ref 21–51)
MCH RBC QN AUTO: 28.8 PG (ref 27–31)
MCV RBC AUTO: 87.9 FL (ref 78–98)
MONOCYTES # BLD AUTO: 0.3 THOU/UL (ref 0.11–0.59)
MONOCYTES NFR BLD AUTO: 1.5 % (ref 0–10)
NEUTROPHILS # BLD AUTO: 15.1 THOU/UL (ref 1.4–6.5)
NEUTROPHILS NFR BLD AUTO: 92.2 % (ref 42–75)
PLATELET # BLD AUTO: 263 THOU/UL (ref 130–400)
POTASSIUM SERPL-SCNC: 4.2 MMOL/L (ref 3.5–5.1)
RBC # BLD AUTO: 4.88 MILL/UL (ref 4.2–5.4)
SODIUM SERPL-SCNC: 137 MMOL/L (ref 136–145)
WBC # BLD AUTO: 16.4 THOU/UL (ref 4.8–10.8)

## 2019-03-06 PROCEDURE — 87804 INFLUENZA ASSAY W/OPTIC: CPT

## 2019-03-06 PROCEDURE — 82550 ASSAY OF CK (CPK): CPT

## 2019-03-06 PROCEDURE — 93005 ELECTROCARDIOGRAM TRACING: CPT

## 2019-03-06 PROCEDURE — 85025 COMPLETE CBC W/AUTO DIFF WBC: CPT

## 2019-03-06 PROCEDURE — 36415 COLL VENOUS BLD VENIPUNCTURE: CPT

## 2019-03-06 PROCEDURE — 83690 ASSAY OF LIPASE: CPT

## 2019-03-06 PROCEDURE — 94640 AIRWAY INHALATION TREATMENT: CPT

## 2019-03-06 PROCEDURE — 71045 X-RAY EXAM CHEST 1 VIEW: CPT

## 2019-03-06 PROCEDURE — 80053 COMPREHEN METABOLIC PANEL: CPT

## 2019-03-06 PROCEDURE — 84484 ASSAY OF TROPONIN QUANT: CPT

## 2019-03-19 ENCOUNTER — HOSPITAL ENCOUNTER (OUTPATIENT)
Dept: HOSPITAL 92 - BICCT | Age: 49
Discharge: HOME | End: 2019-03-19
Attending: INTERNAL MEDICINE
Payer: COMMERCIAL

## 2019-03-19 DIAGNOSIS — J44.9: Primary | ICD-10-CM

## 2019-03-19 DIAGNOSIS — G47.33: ICD-10-CM

## 2019-03-19 DIAGNOSIS — R91.1: ICD-10-CM

## 2019-03-19 PROCEDURE — 71260 CT THORAX DX C+: CPT

## 2020-01-15 ENCOUNTER — HOSPITAL ENCOUNTER (EMERGENCY)
Dept: HOSPITAL 92 - ERS | Age: 50
Discharge: HOME | End: 2020-01-15
Payer: COMMERCIAL

## 2020-01-15 DIAGNOSIS — E78.00: ICD-10-CM

## 2020-01-15 DIAGNOSIS — Z79.899: ICD-10-CM

## 2020-01-15 DIAGNOSIS — I10: ICD-10-CM

## 2020-01-15 DIAGNOSIS — J44.1: Primary | ICD-10-CM

## 2020-01-15 DIAGNOSIS — I25.2: ICD-10-CM

## 2020-01-15 DIAGNOSIS — F41.9: ICD-10-CM

## 2020-01-15 LAB
ALBUMIN SERPL BCG-MCNC: 3.9 G/DL (ref 3.5–5)
ALP SERPL-CCNC: 70 U/L (ref 40–110)
ALT SERPL W P-5'-P-CCNC: 21 U/L (ref 8–55)
ANALYZER IN CARDIO: (no result)
ANION GAP SERPL CALC-SCNC: 15 MMOL/L (ref 10–20)
AST SERPL-CCNC: 17 U/L (ref 5–34)
BASE EXCESS STD BLDA CALC-SCNC: 1.3 MEQ/L
BASOPHILS # BLD AUTO: 0.1 THOU/UL (ref 0–0.2)
BASOPHILS NFR BLD AUTO: 0.9 % (ref 0–1)
BILIRUB SERPL-MCNC: 0.3 MG/DL (ref 0.2–1.2)
BUN SERPL-MCNC: 14 MG/DL (ref 7–18.7)
CA-I BLDA-SCNC: 1.2 MMOL/L (ref 1.12–1.3)
CALCIUM SERPL-MCNC: 9.6 MG/DL (ref 7.8–10.44)
CHLORIDE SERPL-SCNC: 104 MMOL/L (ref 98–107)
CO2 SERPL-SCNC: 25 MMOL/L (ref 22–29)
CREAT CL PREDICTED SERPL C-G-VRATE: 0 ML/MIN (ref 70–130)
EOSINOPHIL # BLD AUTO: 1.6 THOU/UL (ref 0–0.7)
EOSINOPHIL NFR BLD AUTO: 11.4 % (ref 0–10)
GLOBULIN SER CALC-MCNC: 3.2 G/DL (ref 2.4–3.5)
GLUCOSE SERPL-MCNC: 109 MG/DL (ref 70–105)
HCO3 BLDA-SCNC: 26.4 MEQ/L (ref 22–28)
HCT VFR BLDA CALC: 39 % (ref 36–47)
HGB BLD-MCNC: 12.6 G/DL (ref 12–16)
HGB BLDA-MCNC: 13.2 G/DL (ref 12–16)
LYMPHOCYTES # BLD: 3.9 THOU/UL (ref 1.2–3.4)
LYMPHOCYTES NFR BLD AUTO: 27.5 % (ref 21–51)
MCH RBC QN AUTO: 27.1 PG (ref 27–31)
MCV RBC AUTO: 83.4 FL (ref 78–98)
MONOCYTES # BLD AUTO: 1.2 THOU/UL (ref 0.11–0.59)
MONOCYTES NFR BLD AUTO: 8.9 % (ref 0–10)
NEUTROPHILS # BLD AUTO: 7.2 THOU/UL (ref 1.4–6.5)
NEUTROPHILS NFR BLD AUTO: 51.3 % (ref 42–75)
PCO2 BLDA: 43.6 MMHG (ref 35–45)
PH BLDA: 7.4 [PH] (ref 7.35–7.45)
PLATELET # BLD AUTO: 270 THOU/UL (ref 130–400)
PO2 BLDA: 66.1 MMHG (ref 80–100)
POTASSIUM BLD-SCNC: 4.28 MMOL/L (ref 3.7–5.3)
POTASSIUM SERPL-SCNC: 4.7 MMOL/L (ref 3.5–5.1)
RBC # BLD AUTO: 4.66 MILL/UL (ref 4.2–5.4)
RBC UR QL AUTO: (no result) HPF (ref 0–3)
SODIUM SERPL-SCNC: 139 MMOL/L (ref 136–145)
SPECIMEN DRAWN FROM PATIENT: (no result)
WBC # BLD AUTO: 14 THOU/UL (ref 4.8–10.8)
WBC UR QL AUTO: (no result) HPF (ref 0–3)

## 2020-01-15 PROCEDURE — 94660 CPAP INITIATION&MGMT: CPT

## 2020-01-15 PROCEDURE — 80053 COMPREHEN METABOLIC PANEL: CPT

## 2020-01-15 PROCEDURE — 82805 BLOOD GASES W/O2 SATURATION: CPT

## 2020-01-15 PROCEDURE — 83880 ASSAY OF NATRIURETIC PEPTIDE: CPT

## 2020-01-15 PROCEDURE — 71045 X-RAY EXAM CHEST 1 VIEW: CPT

## 2020-01-15 PROCEDURE — 81015 MICROSCOPIC EXAM OF URINE: CPT

## 2020-01-15 PROCEDURE — 93005 ELECTROCARDIOGRAM TRACING: CPT

## 2020-01-15 PROCEDURE — 85025 COMPLETE CBC W/AUTO DIFF WBC: CPT

## 2020-01-15 PROCEDURE — 87040 BLOOD CULTURE FOR BACTERIA: CPT

## 2020-01-15 PROCEDURE — 83605 ASSAY OF LACTIC ACID: CPT

## 2020-01-15 PROCEDURE — 36415 COLL VENOUS BLD VENIPUNCTURE: CPT

## 2020-01-15 PROCEDURE — 81003 URINALYSIS AUTO W/O SCOPE: CPT

## 2020-01-15 PROCEDURE — 84484 ASSAY OF TROPONIN QUANT: CPT

## 2020-01-15 NOTE — RAD
Portable frontal chest radiograph:

1/15/2020



COMPARISON: None



HISTORY: COPD exacerbation, wheezing



FINDINGS: Fusion hardware of lower cervical spine noted. No pneumothorax or pleural fluid. No focal c
onsolidation or alveolar edema. Mild pulmonary hyperinflation suggests air trapping.



IMPRESSION: No focal consolidation or alveolar edema.



Reported By: Jeremías Ley 

Electronically Signed:  1/15/2020 10:58 AM

## 2020-01-23 ENCOUNTER — HOSPITAL ENCOUNTER (INPATIENT)
Dept: HOSPITAL 92 - ERS | Age: 50
LOS: 5 days | Discharge: HOME | DRG: 190 | End: 2020-01-28
Attending: HOSPITALIST | Admitting: HOSPITALIST
Payer: COMMERCIAL

## 2020-01-23 VITALS — BODY MASS INDEX: 27.9 KG/M2

## 2020-01-23 DIAGNOSIS — Z82.49: ICD-10-CM

## 2020-01-23 DIAGNOSIS — I25.10: ICD-10-CM

## 2020-01-23 DIAGNOSIS — G43.909: ICD-10-CM

## 2020-01-23 DIAGNOSIS — G89.29: ICD-10-CM

## 2020-01-23 DIAGNOSIS — Z88.8: ICD-10-CM

## 2020-01-23 DIAGNOSIS — F41.9: ICD-10-CM

## 2020-01-23 DIAGNOSIS — Z98.890: ICD-10-CM

## 2020-01-23 DIAGNOSIS — I10: ICD-10-CM

## 2020-01-23 DIAGNOSIS — I25.2: ICD-10-CM

## 2020-01-23 DIAGNOSIS — G47.30: ICD-10-CM

## 2020-01-23 DIAGNOSIS — Z87.891: ICD-10-CM

## 2020-01-23 DIAGNOSIS — E78.5: ICD-10-CM

## 2020-01-23 DIAGNOSIS — M54.9: ICD-10-CM

## 2020-01-23 DIAGNOSIS — J96.01: ICD-10-CM

## 2020-01-23 DIAGNOSIS — J44.1: Primary | ICD-10-CM

## 2020-01-23 DIAGNOSIS — L02.412: ICD-10-CM

## 2020-01-23 DIAGNOSIS — Z88.0: ICD-10-CM

## 2020-01-23 DIAGNOSIS — Z90.710: ICD-10-CM

## 2020-01-23 DIAGNOSIS — B95.62: ICD-10-CM

## 2020-01-23 DIAGNOSIS — Z88.1: ICD-10-CM

## 2020-01-23 DIAGNOSIS — Z83.6: ICD-10-CM

## 2020-01-23 DIAGNOSIS — Z88.6: ICD-10-CM

## 2020-01-23 LAB
ALBUMIN SERPL BCG-MCNC: 3.8 G/DL (ref 3.5–5)
ALP SERPL-CCNC: 64 U/L (ref 40–110)
ALT SERPL W P-5'-P-CCNC: 21 U/L (ref 8–55)
ANION GAP SERPL CALC-SCNC: 16 MMOL/L (ref 10–20)
AST SERPL-CCNC: 19 U/L (ref 5–34)
BASOPHILS # BLD AUTO: 0.1 THOU/UL (ref 0–0.2)
BASOPHILS NFR BLD AUTO: 0.9 % (ref 0–1)
BILIRUB SERPL-MCNC: 0.3 MG/DL (ref 0.2–1.2)
BUN SERPL-MCNC: 12 MG/DL (ref 7–18.7)
CALCIUM SERPL-MCNC: 8.9 MG/DL (ref 7.8–10.44)
CHLORIDE SERPL-SCNC: 106 MMOL/L (ref 98–107)
CO2 SERPL-SCNC: 23 MMOL/L (ref 22–29)
CREAT CL PREDICTED SERPL C-G-VRATE: 0 ML/MIN (ref 70–130)
EOSINOPHIL # BLD AUTO: 1.3 THOU/UL (ref 0–0.7)
EOSINOPHIL NFR BLD AUTO: 9.7 % (ref 0–10)
GLOBULIN SER CALC-MCNC: 3.2 G/DL (ref 2.4–3.5)
GLUCOSE SERPL-MCNC: 99 MG/DL (ref 70–105)
HGB BLD-MCNC: 11.5 G/DL (ref 12–16)
LYMPHOCYTES # BLD: 4.5 THOU/UL (ref 1.2–3.4)
LYMPHOCYTES NFR BLD AUTO: 32.6 % (ref 21–51)
MCH RBC QN AUTO: 25.6 PG (ref 27–31)
MCV RBC AUTO: 83.3 FL (ref 78–98)
MONOCYTES # BLD AUTO: 1.5 THOU/UL (ref 0.11–0.59)
MONOCYTES NFR BLD AUTO: 11.2 % (ref 0–10)
NEUTROPHILS # BLD AUTO: 6.3 THOU/UL (ref 1.4–6.5)
NEUTROPHILS NFR BLD AUTO: 45.6 % (ref 42–75)
PLATELET # BLD AUTO: 330 THOU/UL (ref 130–400)
POTASSIUM SERPL-SCNC: 3.6 MMOL/L (ref 3.5–5.1)
RBC # BLD AUTO: 4.48 MILL/UL (ref 4.2–5.4)
SODIUM SERPL-SCNC: 141 MMOL/L (ref 136–145)
WBC # BLD AUTO: 13.8 THOU/UL (ref 4.8–10.8)

## 2020-01-23 PROCEDURE — 85027 COMPLETE CBC AUTOMATED: CPT

## 2020-01-23 PROCEDURE — 80053 COMPREHEN METABOLIC PANEL: CPT

## 2020-01-23 PROCEDURE — 85025 COMPLETE CBC W/AUTO DIFF WBC: CPT

## 2020-01-23 PROCEDURE — 87205 SMEAR GRAM STAIN: CPT

## 2020-01-23 PROCEDURE — 71045 X-RAY EXAM CHEST 1 VIEW: CPT

## 2020-01-23 PROCEDURE — S0020 INJECTION, BUPIVICAINE HYDRO: HCPCS

## 2020-01-23 PROCEDURE — 94640 AIRWAY INHALATION TREATMENT: CPT

## 2020-01-23 PROCEDURE — 80202 ASSAY OF VANCOMYCIN: CPT

## 2020-01-23 PROCEDURE — 85007 BL SMEAR W/DIFF WBC COUNT: CPT

## 2020-01-23 PROCEDURE — 96374 THER/PROPH/DIAG INJ IV PUSH: CPT

## 2020-01-23 PROCEDURE — 87186 SC STD MICRODIL/AGAR DIL: CPT

## 2020-01-23 PROCEDURE — 84484 ASSAY OF TROPONIN QUANT: CPT

## 2020-01-23 PROCEDURE — 36415 COLL VENOUS BLD VENIPUNCTURE: CPT

## 2020-01-23 PROCEDURE — 80048 BASIC METABOLIC PNL TOTAL CA: CPT

## 2020-01-23 PROCEDURE — 94644 CONT INHLJ TX 1ST HOUR: CPT

## 2020-01-23 PROCEDURE — 87077 CULTURE AEROBIC IDENTIFY: CPT

## 2020-01-23 PROCEDURE — 87070 CULTURE OTHR SPECIMN AEROBIC: CPT

## 2020-01-23 PROCEDURE — 93005 ELECTROCARDIOGRAM TRACING: CPT

## 2020-01-23 RX ADMIN — GUAIFENESIN AND DEXTROMETHORPHAN HYDROBROMIDE SCH TAB: 600; 30 TABLET, EXTENDED RELEASE ORAL at 22:04

## 2020-01-23 RX ADMIN — GUAIFENESIN AND DEXTROMETHORPHAN HYDROBROMIDE SCH TAB: 600; 30 TABLET, EXTENDED RELEASE ORAL at 09:57

## 2020-01-23 RX ADMIN — SULFAMETHOXAZOLE AND TRIMETHOPRIM SCH TAB: 800; 160 TABLET ORAL at 22:04

## 2020-01-23 RX ADMIN — SULFAMETHOXAZOLE AND TRIMETHOPRIM SCH TAB: 800; 160 TABLET ORAL at 09:59

## 2020-01-23 RX ADMIN — Medication SCH: at 22:09

## 2020-01-23 NOTE — HP
CHIEF COMPLAINT:  Shortness of breath and wheezing.



HISTORY OF PRESENT ILLNESS:  The patient is a 49-year-old  female with

acute onset of shortness of breath.  Apparently, she started coughing a couple of

days ago without fever or chills.  She got very short of breath this morning around

3 o'clock, and she came to the emergency room for further evaluation.  She was found

to have a lot of wheezing.  She was treated, but she did not feel like she was ready

to go back home since the treatment she received in the emergency room did not help

her much, so decision was made about keeping her in the hospital for observation.

She had similar episodes of shortness of breath before.  Apparently, she started

having some pain and discomfort in the left armpit where she had previously MRSA

infection.  She started taking clindamycin which she had at home for that

approximately 4 days ago.  She had some chest tightness, but not any sharp pain.

She is not able to cough up anything.  She has dry cough.  She feels tired.  She has

continuous back pain since she had surgery on her lower back. 



PAST MEDICAL HISTORY:  

1. Hypertension.

2. Coronary artery disease.

3. Hyperlipidemia.

4. COPD.

5. Sleep apnea.

6. Left armpit MRSA skin infection.



PAST SURGICAL HISTORY:  

1. Hysterectomy.

2. Tonsillectomy.

3. Appendectomy.

4. Bladder suspension.

5. Low back surgery.

6. Migraine headaches.



HOME MEDICATIONS:  

1. Cymbalta 30 mg once a day.

2. Morphine extended-release tablets 15 mg 3 times a day.

3. Robaxin 750 mg 3 times a day.

4. Montelukast 10 mg once a day.

5. Estradiol 10 mg once a day.

6. Mirapex 0.5 mg four tablets once a day.

7. Carvedilol 6.25 mg twice a day.

8. Clindamycin 300 mg twice a day.

9. The patient finished Medrol Dosepak approximately 2 to 3 days ago.



FAMILY HISTORY:  Father had cardiomyopathy and he passed away at the age of 92, and

mother had coronary artery disease and COPD and she passed away in her 90s. 



ALLERGIES:  MULTIPLE; CLARITIN, DOXYCYCLINE, GEODON, HALDOL, KETAMINE, KETORALAC,

LEVAQUIN, PENICILLINS, TORADOL, AND ZIPRASIDONE. 



SOCIAL HISTORY:  She used to smoke approximately 1 pack of cigarettes per day.  She

quit in November 2019.  She has been smoking approximately for 40+ years. 



REVIEW OF SYSTEMS:  All 14 systems were reviewed and they are negative except for

symptoms mentioned in HPI. 



PRIMARY CARE PHYSICIAN:  [facility]. 



Her surrogate decision maker is her , Nolberto Ro.



PHYSICAL EXAMINATION:

GENERAL:  She is not in any significant distress, but she has significant amount of

wheezing, which is audible without any stethoscope. 

VITAL SIGNS:  Her blood pressure is 135/80, pulse is 86, respiratory rate is 20, and

O2 saturation is 94% on room air, and temperature is 97.9. 

HEENT:  Head is atraumatic and normocephalic.  Eyes are PERRLA.  Sclerae are

nonicteric.  Oral mucosa is somewhat dry. 

NECK:  Supple. 

LUNGS:  Bilateral wheezing present.  No crackles. 

HEART:  S1 and S2 normal.  No S3.  No S4. 

ABDOMEN:  Nontender and nondistended.  Bowel sounds are present.  No organomegaly. 

EXTREMITIES:  No clubbing, cyanosis or edema. 

NEUROLOGICAL:  She is alert and oriented x4.  There is no any motor or sensory

deficits. 



LABORATORY DATA:  Labs showed white count of 13.8, hemoglobin of 11.5, hematocrit of

37.3, and platelet count of 330,000.  Normal chemistry.  Troponin I less than 0.010. 



IMAGING STUDIES:  Chest x-ray personally reviewed by me showed no acute

cardiopulmonary disease.  Also, radiologist reported some nodular opacity in the

right upper lung, which was confirmed correlating with a CT scan which was done in

November 2019. 



EKG personally reviewed by me showed normal sinus rhythm with ventricular rate of 88

beats per minute, no ischemic changes. 



IMPRESSION:  

1. Dyspnea, most likely exacerbation of chronic obstructive pulmonary disease.

2. Left armpit methicillin-resistant Staphylococcus aureus recurrent infection, mild.

3. Hypertension.

4. Hyperlipidemia.

5. History of previous myocardial infarction.

6. Coronary artery disease, mild per the patient's cardiac catheterization done by

[facility]. 

7. Anxiety.

8. Chronic back pain.

9. Sleep apnea.

10. Migraine headaches.



PLAN:  Admission for observation.  Condition is fair.  Activity, bedrest and

bathroom privileges.  IV normal saline at 75 mL/h.  Low-salt diet.  Solu-Medrol 40

mg IV push every 6 hours.  Albuterol and Atrovent, DuoNebs q.4 inhalations, Bactrim

DS one tablet twice a day, and albuterol p.r.n. q.4 hours inhalations.  DVT

prophylaxis with SCDs and 

Lovenox.  The patient is going to follow up with Dr. Apodaca, her pulmonologist,

after the discharge, and the right upper lobe nodule will be addressed during this

visit, and 

we are going to continue her home medications except for clindamycin.  Bactrim

should cover both left armpit MRSA infection and COPD exacerbation. 







Job ID:  [id number]

## 2020-01-23 NOTE — RAD
PORTABLE CHEST:

 

Date:  01/23/2020

 

HISTORY:  

Shortness of breath. History of COPD. 

 

COMPARISON:  

01/15/2020. 

 

FINDINGS:

Lungs are well aerated and appear clear of infiltrate. A nodular opacity overlying the right upper eduardo
ng may correspond to an area of linear stranding or atelectasis seen extending to the pleural surface
 anteriorly on CT of 11/24/2019. Heart and mediastinum unremarkable. Vasculature normal. 

 

IMPRESSION: 

No evidence of acute infiltrate. A nodular opacity overlies the right upper lung and correlation is m
lucero to recent CT of 11/24/2019 as noted above. 

 

 

POS: [facility]

## 2020-01-24 LAB
ANION GAP SERPL CALC-SCNC: 14 MMOL/L (ref 10–20)
BUN SERPL-MCNC: 15 MG/DL (ref 7–18.7)
CALCIUM SERPL-MCNC: 8.9 MG/DL (ref 7.8–10.44)
CHLORIDE SERPL-SCNC: 107 MMOL/L (ref 98–107)
CO2 SERPL-SCNC: 21 MMOL/L (ref 22–29)
CREAT CL PREDICTED SERPL C-G-VRATE: 118 ML/MIN (ref 70–130)
GLUCOSE SERPL-MCNC: 123 MG/DL (ref 70–105)
HGB BLD-MCNC: 10.8 G/DL (ref 12–16)
MCH RBC QN AUTO: 26.4 PG (ref 27–31)
MCV RBC AUTO: 82.8 FL (ref 78–98)
MDIFF COMPLETE?: YES
PLATELET # BLD AUTO: 345 THOU/UL (ref 130–400)
POTASSIUM SERPL-SCNC: 4.3 MMOL/L (ref 3.5–5.1)
RBC # BLD AUTO: 4.08 MILL/UL (ref 4.2–5.4)
SODIUM SERPL-SCNC: 138 MMOL/L (ref 136–145)
WBC # BLD AUTO: 16.2 THOU/UL (ref 4.8–10.8)

## 2020-01-24 RX ADMIN — Medication SCH ML: at 20:42

## 2020-01-24 RX ADMIN — GUAIFENESIN AND DEXTROMETHORPHAN HYDROBROMIDE SCH TAB: 600; 30 TABLET, EXTENDED RELEASE ORAL at 20:40

## 2020-01-24 RX ADMIN — Medication SCH: at 08:15

## 2020-01-24 RX ADMIN — SULFAMETHOXAZOLE AND TRIMETHOPRIM SCH TAB: 800; 160 TABLET ORAL at 08:14

## 2020-01-24 RX ADMIN — SULFAMETHOXAZOLE AND TRIMETHOPRIM SCH TAB: 800; 160 TABLET ORAL at 20:42

## 2020-01-24 RX ADMIN — GUAIFENESIN AND DEXTROMETHORPHAN HYDROBROMIDE SCH TAB: 600; 30 TABLET, EXTENDED RELEASE ORAL at 08:14

## 2020-01-24 RX ADMIN — Medication PRN ML: at 18:05

## 2020-01-24 NOTE — PRG
DATE OF SERVICE:  01/24/2020



SUBJECTIVE:  The patient is seen and examined at the bedside.  She is still

wheezing.  She noticed that she has to go to the bathroom and pee all the time.  She

was not able to sleep much last night, but she requires melatonin and she did not

request any melatonin last night. 



OBJECTIVE:  VITAL SIGNS:  Blood pressure is 144/74, pulse is 78, temperature is

98.1, maximal temperature is 98.8, pulse oximetry is 95% on room air. 

HEENT:  Her head is atraumatic and normocephalic.  Eyes are PERRLA.  Sclerae are

nonicteric.  Oral mucosa is moist. 

NECK:  Supple. 

LUNGS:  Bilateral wheezes, moderate all over in both lungs. 

HEART:  S1 and S2, normal.  No S3.  No S4. 

ABDOMEN:  Soft, nontender, nondistended. 

EXTREMITIES:  No clubbing, cyanosis, or edema. 

NEUROLOGIC:  She is alert and oriented x4.  There are no any motor or sensory

deficits. 



LABORATORY DATA:  Labs showed white count of 16.2, hemoglobin of 10.8, hematocrit

33.8, platelet count is 345,000.  Sodium of 138, potassium 4.3, chloride 107, CO2 of

21, BUN 15, creatinine 0.73, glucose 123, and calcium 8.9. 



IMPRESSION:  

1. Acute on chronic dyspnea, exacerbation of chronic obstructive pulmonary disease,

bronchitis type. 

2. 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

3. Hypertension.

4. Hyperlipidemia.

5. History of previous myocardial infarction.

6. Coronary artery disease, mild per the patient's cardiac catheterization done by

[facility]. 

7. Anxiety.

8. Chronic back pain.

9. Sleep apnea.

10. Migraine headaches.



PLAN:  We will switch her to full admission.  The patient is still wheezing.  We

will continue her IV Solu-Medrol.  We will start her on an inhaled steroids.  We

will continue her Mucinex.  Continue her Ativan.  We will continue isolation for

possible MRSA infection of the left armpit.  Continue DuoNeb. 







Job ID:  [id number]

## 2020-01-25 RX ADMIN — GUAIFENESIN AND DEXTROMETHORPHAN HYDROBROMIDE SCH TAB: 600; 30 TABLET, EXTENDED RELEASE ORAL at 09:01

## 2020-01-25 RX ADMIN — Medication SCH ML: at 09:04

## 2020-01-25 RX ADMIN — SULFAMETHOXAZOLE AND TRIMETHOPRIM SCH TAB: 800; 160 TABLET ORAL at 09:01

## 2020-01-25 RX ADMIN — GUAIFENESIN AND DEXTROMETHORPHAN HYDROBROMIDE SCH TAB: 600; 30 TABLET, EXTENDED RELEASE ORAL at 19:56

## 2020-01-25 RX ADMIN — SULFAMETHOXAZOLE AND TRIMETHOPRIM SCH TAB: 800; 160 TABLET ORAL at 19:55

## 2020-01-25 RX ADMIN — Medication SCH ML: at 20:04

## 2020-01-25 NOTE — PRG
DATE OF SERVICE:  01/25/2020



SUBJECTIVE:  The patient is seen and examined at the bedside.  She started coughing

up some phlegm and she feels somewhat better.  Her appetite is very good.  She did

not have any bowel movement yet, but she is back on her stool softener. 



OBJECTIVE:  VITAL SIGNS:  Blood pressure is 135/88, pulse is 71, respiratory rate is

16, temperature is 98.6, and O2 saturation is 94% on room air. 

HEENT:  Head is atraumatic and normocephalic.  Eyes are PERRLA.  Sclerae are

nonicteric.  Oral mucosa is moist. 

NECK:  Supple. 

LUNGS:  Bilateral rales and wheezing present. 

HEART:  S1 and S2 normal.  No S3.  No S4.  No any murmur. 

ABDOMEN:  Soft, nontender.  Bowel sounds are present.  No organomegaly. 

EXTREMITIES:  No clubbing, cyanosis, edema. 

NEUROLOGIC:  She is alert and oriented x4.  There is no any motor or sensory

deficits. 



LABORATORY DATA:  None today.



IMPRESSION:  

1. Acute on chronic dyspnea, exacerbation of chronic obstructive pulmonary disease,

bronchitis type. 

2. 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

3. Hypertension.

4. Hyperlipidemia.

5. History of previous myocardial infarction.

6. Coronary artery disease mild per the patient's cardiac catheterization done at

[facility]. 

7. Anxiety.

8. Chronic back pain.

9. Sleep apnea.

10. Migraine headaches.



PLAN:  To continue her IV steroids and inhaled steroids.  Start her on p.r.n.

Ativan.  Continue isolation for MRSA.  Continue DuoNebs and continue Mucinex.  I

think we need additional 24 maybe 48 hours to start coughing up more in secretion

from her respiratory tract and she should be able to go home at this time.  For now,

she is admitted. 







Job ID:  [id number]

## 2020-01-26 LAB
BASOPHILS # BLD AUTO: 0.1 THOU/UL (ref 0–0.2)
BASOPHILS NFR BLD AUTO: 0.4 % (ref 0–1)
EOSINOPHIL # BLD AUTO: 0 THOU/UL (ref 0–0.7)
EOSINOPHIL NFR BLD AUTO: 0.1 % (ref 0–10)
HGB BLD-MCNC: 11.6 G/DL (ref 12–16)
LYMPHOCYTES # BLD: 2.7 THOU/UL (ref 1.2–3.4)
LYMPHOCYTES NFR BLD AUTO: 16.3 % (ref 21–51)
MCH RBC QN AUTO: 25.7 PG (ref 27–31)
MCV RBC AUTO: 85.5 FL (ref 78–98)
MONOCYTES # BLD AUTO: 1.3 THOU/UL (ref 0.11–0.59)
MONOCYTES NFR BLD AUTO: 8.1 % (ref 0–10)
NEUTROPHILS # BLD AUTO: 12.3 THOU/UL (ref 1.4–6.5)
NEUTROPHILS NFR BLD AUTO: 75.1 % (ref 42–75)
PLATELET # BLD AUTO: 332 THOU/UL (ref 130–400)
RBC # BLD AUTO: 4.52 MILL/UL (ref 4.2–5.4)
WBC # BLD AUTO: 16.3 THOU/UL (ref 4.8–10.8)

## 2020-01-26 RX ADMIN — SULFAMETHOXAZOLE AND TRIMETHOPRIM SCH TAB: 800; 160 TABLET ORAL at 20:54

## 2020-01-26 RX ADMIN — SULFAMETHOXAZOLE AND TRIMETHOPRIM SCH TAB: 800; 160 TABLET ORAL at 08:34

## 2020-01-26 RX ADMIN — Medication SCH ML: at 08:45

## 2020-01-26 RX ADMIN — GUAIFENESIN AND DEXTROMETHORPHAN HYDROBROMIDE SCH TAB: 600; 30 TABLET, EXTENDED RELEASE ORAL at 20:55

## 2020-01-26 RX ADMIN — VANCOMYCIN HYDROCHLORIDE SCH MLS: 1 INJECTION, POWDER, LYOPHILIZED, FOR SOLUTION INTRAVENOUS at 22:47

## 2020-01-26 RX ADMIN — VANCOMYCIN HYDROCHLORIDE SCH MLS: 1 INJECTION, POWDER, LYOPHILIZED, FOR SOLUTION INTRAVENOUS at 10:50

## 2020-01-26 RX ADMIN — Medication SCH ML: at 20:56

## 2020-01-26 RX ADMIN — GUAIFENESIN AND DEXTROMETHORPHAN HYDROBROMIDE SCH TAB: 600; 30 TABLET, EXTENDED RELEASE ORAL at 08:32

## 2020-01-26 NOTE — CON
DATE OF CONSULTATION:  



CHIEF COMPLAINT:  Left axillary infection.



HISTORY OF PRESENT ILLNESS:  The patient is a 49-year-old female, who has had

multiple I and D's of subcutaneous abscesses due to MRSA.  She reports a 7-day

history of infection in her left axilla.  She has tried to drain it herself with a

needle.  No drainage. 



PAST MEDICAL HISTORY:  Hypertension, hyperlipidemia, coronary artery disease, COPD,

sleep apnea, and MRSA. 



PAST SURGICAL HISTORY:  Hysterectomy, tonsil and adenoidectomy, appendectomy, and

bladder suspension.  She recently had back surgery in November. 



MEDICATIONS:  

1. Cymbalta.

2. Morphine.

3. Robaxin.

4. Montelukast.

5. Estradiol.

6. Mirapex.

7. Carvedilol.

8. Clindamycin.

9. Solu-Medrol.



ALLERGIES:  SHE HAS ALLERGIES TO DOXYCYCLINE, HALDOL, KETAMINE, GEODON, LEVAQUIN,

PENICILLINS, TORADOL, AND ZIPRASIDONE. 



SOCIAL HISTORY:  She quit smoking in November.  She is .  Occasional alcohol.



PHYSICAL EXAMINATION:

VITAL SIGNS:  Temperature 98.6, pulse 69, and blood pressure 137/73. 

GENERAL:  She has stigmata of steroid use with a round face and excessive adipose

distribution. 

LUNGS:  She has some wheezing. 

HEART:  Regular rate and rhythm. 

ABDOMEN:  Obese, soft, and nontender. 

EXTREMITIES:  She has about 7 small carbuncles in the hair-bearing area of her left

axilla.  There is no definite fluctuance.  There is some mild cellulitis. 



ASSESSMENT:  Staph folliculitis of the left axilla.



PLAN:  The patient has just had a regular diet.  I recommend we treat her with

vancomycin and reassess in the morning and keep her n.p.o. after midnight, possible

I and D of carbuncles under anesthesia. 







Job ID:  [id number]

## 2020-01-26 NOTE — PRG
DATE OF SERVICE:  01/26/2020



SUBJECTIVE:  The patient is seen and examined at the bedside.  She started having

more discomfort in the left armpit area, and she started coughing up more phlegm.

She had small bowel movement.  Her appetite is very good. 



OBJECTIVE:  VITAL SIGNS:  Blood pressure is 137/73, pulse is 69, temperature is

98.6, O2 saturation is 94% on room air. 

HEAD:  Atraumatic and normocephalic. 

EYES:  PERRLA.  Sclerae are nonicteric. 

ENT:  Oral mucosa is moist. 

NECK:  Supple. 

LUNGS:  Bilateral wheezing and rales still present quite significantly, maybe

slightly less than yesterday, but still very significant, requiring IV steroids. 

HEART:  S1, S2 normal.  No S3.  No S4. 

ABDOMEN:  Soft, nontender, nondistended. 

EXTREMITIES:  No clubbing, cyanosis, or edema.  Her left armpit shows enlargement of

a few areas with some erythema and now present over those areas, tender to touch,

but not open, not draining anything. 

NEUROLOGIC:  She is alert and oriented x4.  There are no any motor or sensory

deficits. 



LABORATORY DATA:  Labs showed white count of 16.3, hemoglobin 11.6, hematocrit 38.7,

platelet count is 332,000. 



IMPRESSION:  

1. Acute on chronic dyspnea, exacerbation of chronic obstructive pulmonary disease

with bronchitis type. 

2. Left armpit most likely methicillin-resistant Staphylococcus aureus infection.

We will get general surgeon to take a look at this area and make decision whether

this needs to be opened and drained, but she has a history of previous MRSA

infection in this area. 

3. Hypertension.

4. Hyperlipidemia.

5. History of previous myocardial infarction.

6. Coronary artery disease which is mild per the patient's cardiac catheterization,

this was done at [facility]. 

7. Anxiety.

8. Chronic back pain.

9. Sleep apnea.

10. Migraine headaches, chronic, stable.



PLAN:  Yesterday, her IV steroids dose was decreased.  She will continue on her

inhaled steroids.  She will continue on DuoNebs and Mucinex.  We will have general

surgeon, Dr. Flower, to stop by and look at her left armpit and see whether this

needs to be open and drained.  We will continue her Bactrim and most likely she will

be able to go home in the next 24-48 hours. 







Job ID:  [id number]

## 2020-01-27 LAB — VANCOMYCIN TROUGH SERPL-MCNC: 9.6 UG/ML

## 2020-01-27 PROCEDURE — 0X950ZZ DRAINAGE OF LEFT AXILLA, OPEN APPROACH: ICD-10-PCS | Performed by: SURGERY

## 2020-01-27 RX ADMIN — Medication SCH ML: at 11:25

## 2020-01-27 RX ADMIN — SULFAMETHOXAZOLE AND TRIMETHOPRIM SCH TAB: 800; 160 TABLET ORAL at 20:35

## 2020-01-27 RX ADMIN — SULFAMETHOXAZOLE AND TRIMETHOPRIM SCH TAB: 800; 160 TABLET ORAL at 11:24

## 2020-01-27 RX ADMIN — Medication SCH ML: at 20:38

## 2020-01-27 RX ADMIN — VANCOMYCIN HYDROCHLORIDE SCH MLS: 1 INJECTION, POWDER, LYOPHILIZED, FOR SOLUTION INTRAVENOUS at 11:21

## 2020-01-27 RX ADMIN — GUAIFENESIN AND DEXTROMETHORPHAN HYDROBROMIDE SCH TAB: 600; 30 TABLET, EXTENDED RELEASE ORAL at 11:24

## 2020-01-27 RX ADMIN — HYDROCODONE BITARTRATE AND ACETAMINOPHEN PRN TAB: 5; 325 TABLET ORAL at 18:01

## 2020-01-27 RX ADMIN — GUAIFENESIN AND DEXTROMETHORPHAN HYDROBROMIDE SCH TAB: 600; 30 TABLET, EXTENDED RELEASE ORAL at 20:36

## 2020-01-27 RX ADMIN — VANCOMYCIN HYDROCHLORIDE SCH MLS: 1 INJECTION, POWDER, LYOPHILIZED, FOR SOLUTION INTRAVENOUS at 23:23

## 2020-01-27 RX ADMIN — HYDROCODONE BITARTRATE AND ACETAMINOPHEN PRN TAB: 5; 325 TABLET ORAL at 12:58

## 2020-01-27 NOTE — OP
DATE OF PROCEDURE:  01/27/2020



PREOPERATIVE DIAGNOSIS:  Left axillary abscess.



PROCEDURE PERFORMED:  Incision and drainage x5, left axilla.



INDICATIONS:  This is a 49-year-old female with history of multiple MRSA infections

who developed a painful swelling in the left axilla, did not respond to antibiotics. 



FINDINGS:  Five small abscesses, one of which was about 2 cm x 1 cm containing thick

purulent fluid.  Then, there were 4 others that were smaller, about 5 mm in diameter

with small amounts of purulent fluid. 



DESCRIPTION OF PROCEDURE:  After informed consent was obtained, the patient was

taken to the operating room, given general mask anesthesia, placed in supine

position.  Her axilla was prepped and draped in usual fashion.  Local anesthesia

infiltrated subcutaneously and deep with Marcaine.  An elliptical incision was

performed in the largest one releasing about 5 mL of thick purulent fluid.  This was

sent for culture.  The ellipse was unroofed.  Loculations were broken up with a

hemostat.  Hemostasis achieved with electrocautery.  The wound thoroughly irrigated.

 Then, small stab wounds were performed on each of the other 4 smaller abscesses

releasing purulent fluid.  There were dilated with the hemostat and irrigated.  A

sterile bandage applied.  The patient tolerated the procedure well, transferred to

Recovery in good condition.  Sponge and needle count verified correct x2. 







Job ID:  [id number]

## 2020-01-28 VITALS — TEMPERATURE: 98.8 F | SYSTOLIC BLOOD PRESSURE: 149 MMHG | DIASTOLIC BLOOD PRESSURE: 94 MMHG

## 2020-01-28 RX ADMIN — Medication SCH ML: at 07:33

## 2020-01-28 RX ADMIN — VANCOMYCIN HYDROCHLORIDE SCH MLS: 1 INJECTION, POWDER, LYOPHILIZED, FOR SOLUTION INTRAVENOUS at 06:23

## 2020-01-28 RX ADMIN — SULFAMETHOXAZOLE AND TRIMETHOPRIM SCH TAB: 800; 160 TABLET ORAL at 07:26

## 2020-01-28 RX ADMIN — Medication PRN ML: at 12:19

## 2020-01-28 RX ADMIN — VANCOMYCIN HYDROCHLORIDE SCH: 1 INJECTION, POWDER, LYOPHILIZED, FOR SOLUTION INTRAVENOUS at 15:46

## 2020-01-28 RX ADMIN — GUAIFENESIN AND DEXTROMETHORPHAN HYDROBROMIDE SCH TAB: 600; 30 TABLET, EXTENDED RELEASE ORAL at 07:27

## 2020-01-28 NOTE — PRG
DATE OF SERVICE:  01/28/2020



SUBJECTIVE:  The patient is doing well after I and D of axilla.  The pain is well

controlled. 



OBJECTIVE:  VITAL SIGNS:  Temperature 97.6, pulse 68, blood pressure 143/78. 

SKIN:  Clean.  There is no evidence of cellulitis.  Minimal soft tissue swelling.



ASSESSMENT:  Doing well.



PLAN:  Follow up with me in 2 weeks.







Job ID:  [id number]

## 2020-01-28 NOTE — DIS
DATE OF ADMISSION:  01/23/2020



DATE OF DISCHARGE:  01/28/2020



PRIMARY CARE PROVIDER:  Jesenia Rios.



FINAL DIAGNOSES:  

1. Chronic obstructive pulmonary disease with acute exacerbation.

2. Hypertension.

3. Abscess of the left axilla.

4. Acute respiratory failure with hypoxemia.



DISCHARGE MEDICATIONS:  

1. Albuterol inhaler 2 puffs q.4 hours p.r.n.

2. Mirapex 2 mg at bedtime.

3. Morphine sulfate 15 mg p.o. t.i.d.

4. Methocarbamol 750 mg p.o. t.i.d.

5. Trazodone 50 mg at bedtime.

6. Singulair 10 mg a day.

7. Coreg 6.25 mg daily.

8. Estradiol 2 mg daily.

9. Cymbalta 30 mg a day.

10. Medrol Dosepak, start tomorrow.

11. Bactrim DS one tablet b.i.d. for 2 weeks, starting tomorrow.



ALLERGIES:  DOXYCYCLINE, HALOPERIDOL, KETAMINE, TORADOL, LEVAQUIN, CLARITIN,

PENICILLINS, GEODON. 



CODE STATUS:  Full.



DIET:  Regular.



PENDING AT TIME OF DISCHARGE:  Nothing.



HOSPITAL [facility]  [facility] with shortness of breath and wheezing.  She had been to

the emergency room, treated, came back.  She was also having some left pain,

discomfort in her left armpit.  She has a history of hypertension, coronary artery

disease, dyslipidemia, COPD, and sleep apnea.  She was treated aggressively with

nebs, steroids, and antibiotics.  Her left axilla was drained by Dr. Trevin Flower on

01/27/2020, after seeing her in consultation on 01/26/2020.  She is currently still

wheezing a bit, but is really ready to go home.  Dr. Flower recommended 2 weeks

Septra DS.  I noted that she was not on any home medicines for COPD, acute phase or

chronic.  I started her on albuterol and a Medrol Dosepak as she went home to

improve things.  I have asked her to follow up with her PCP in 3 days. 







Job ID:  [id number]

## 2020-01-28 NOTE — PDOC.HOSPP
- Subjective


Encounter Date: 01/28/20


Encounter Time: 12:05


Subjective: 





sob ok, no fever, etc





- Objective


Vital Signs & Weight: 


 Vital Signs (12 hours)











  Temp Pulse Resp BP BP Pulse Ox


 


 01/28/20 10:42   68  16    95


 


 01/28/20 08:00       96


 


 01/28/20 07:40  97.6 F  86  16   143/78 H  96


 


 01/28/20 07:26     135/74  


 


 01/28/20 07:24   83  18    96








 Weight











Admit Weight                   178 lb 6 oz


 


Weight                         178 lb 6 oz














I&O: 


 











 01/27/20 01/28/20 01/29/20





 06:59 06:59 06:59


 


Intake Total  2450 240


 


Balance  2450 240











Result Diagrams: 


 01/26/20 06:11





 01/24/20 04:41





Hospitalist ROS





- Medication


Medications: 


Active Medications











Generic Name Dose Route Start Last Admin





  Trade Name Freq  PRN Reason Stop Dose Admin


 


Hydrocodone Bitart/Acetaminophen  2 tab  01/27/20 10:25  01/27/20 18:01





  Norco 5/325  PO   2 tab





  Q4H PRN   Administration





  Moderate to Severe Pain (6-10)   





     





     





     


 


Albuterol Sulfate  2.5 mg  01/23/20 08:51  01/24/20 04:59





  Ventolin  NEB   2.5 mg





  [id number] PRN   Administration





  Wheezing   





     





     





     


 


Albuterol/Ipratropium  3 ml  01/23/20 11:00  01/28/20 10:42





  Duoneb  NEB   3 ml





  [id number] LUIS   Administration





     





     





     





     


 


Budesonide  0.5 mg  01/24/20 18:30  01/28/20 07:24





  Pulmicort Neb Solution  INH   0.5 mg





  BID-RT ULIS   Administration





     





     





     





     


 


Carvedilol  6.25 mg  01/23/20 09:00  01/28/20 07:26





  Coreg  PO   6.25 mg





  DAILY LUIS   Administration





     





     





     





     


 


Diphenhydramine HCl  25 mg  01/23/20 15:21  01/27/20 15:38





  Benadryl  PO   25 mg





  Q4H PRN   Administration





  Itching   





     





     





     


 


Docusate Sodium  100 mg  01/24/20 12:37  01/25/20 09:17





  Colace  PO   100 mg





  DAILYPRN PRN   Administration





  Constipation   





     





     





     


 


Duloxetine HCl  30 mg  01/23/20 09:00  01/28/20 07:27





  Cymbalta  PO   30 mg





  DAILY LUIS   Administration





     





     





     





     


 


Enoxaparin Sodium  40 mg  01/24/20 09:00  01/28/20 07:27





  Lovenox  SC   40 mg





  0900 LUIS   Administration





     





     





     





     


 


Estradiol  2 mg  01/23/20 09:00  01/27/20 11:23





  Estrace  PO   2 mg





  DAILY LUIS   Administration





     





     





     





     


 


Guaifenesin/Dextromethorphan  2 tab  01/23/20 09:00  01/28/20 07:27





  Mucinex Dm  PO   2 tab





  Q12HR LUIS   Administration





     





     





     





     


 


Vancomycin HCl 1.25 gm/ Sodium  250 mls @ 166.667 mls/hr  01/27/20 23:00  01/28/ 20 06:23





  Chloride  IVPB   250 mls





  0700,1500,2300 LUIS   Administration





     





     





     





     


 


Lorazepam  0.5 mg  01/25/20 11:36  01/28/20 07:25





  Ativan  PO   0.5 mg





  Q4H PRN   Administration





  Anxiety   





     





     





     


 


Melatonin  3 mg  01/25/20 21:00  01/27/20 20:35





  Melatonin  PO   3 mg





  HS LUIS   Administration





     





     





     





     


 


Menthol/Methyl Salicylate  0 gm  01/23/20 23:05  01/23/20 23:20





  Muscle Rub Cream (Bengay)  TOP   1 gm





  ASDIR PRN   Administration





  Muscle Pain   





     





     





     


 


Methocarbamol  750 mg  01/23/20 15:00  01/28/20 07:25





  Robaxin  PO   750 mg





  TID LUIS   Administration





     





     





     





     


 


Methylprednisolone Sodium Succinate  20 mg  01/25/20 12:00  01/28/20 05:55





  Solu-Medrol  IVP   20 mg





  Q6HR LUIS   Administration





     





     





     





     


 


Montelukast Sodium  10 mg  01/23/20 09:00  01/28/20 07:26





  Singulair  PO   10 mg





  DAILY LUIS   Administration





     





     





     





     


 


Morphine Sulfate  15 mg  01/23/20 09:00  01/28/20 07:26





  Ms Contin  PO   15 mg





  TID LUIS   Administration





     





     





     





     


 


Pramipexole Dihydrochloride  2 mg  01/23/20 21:00  01/27/20 20:34





  Mirapex  PO   2 mg





  HS LUIS   Administration





     





     





     





     


 


Sodium Chloride  10 ml  01/23/20 21:00  01/28/20 07:33





  Flush - Normal Saline  IVF   10 ml





  Q12HR LUIS   Administration





     





     





     





     


 


Sodium Chloride  10 ml  01/23/20 09:26  01/24/20 18:05





  Flush - Normal Saline  IVF   10 ml





  PRN PRN   Administration





  Saline Flush   





     





     





     


 


Trazodone HCl  50 mg  01/23/20 21:00  01/27/20 20:35





  Desyrel  PO   50 mg





  HS LUIS   Administration





     





     





     





     


 


Trimethoprim/Sulfamethoxazole  1 tab  01/23/20 09:00  01/28/20 07:26





  Bactrim Ds  PO   1 tab





  BID LUIS   Administration





     





     





     





     














- Exam


General Appearance: awake alert


Neck: no JVD


Heart: RRR, no murmur


Respiratory - other findings: mild sonorous wheezes


Gastrointestinal: soft, normal bowel sounds


Extremities: no edema





Hosp A/P


(1) COPD exacerbation


Code(s): J44.1 - CHRONIC OBSTRUCTIVE PULMONARY DISEASE W (ACUTE) EXACERBATION   

Status: Acute   





(2) Acute respiratory failure with hypoxia


Code(s): J96.01 - ACUTE RESPIRATORY FAILURE WITH HYPOXIA   Status: Acute   





(3) Abscess of axilla, left


Code(s): L02.412 - CUTANEOUS ABSCESS OF LEFT AXILLA   Status: Acute   





- Plan


discuss DC with Gen Surg

## 2020-02-09 ENCOUNTER — HOSPITAL ENCOUNTER (EMERGENCY)
Dept: HOSPITAL 92 - ERS | Age: 50
LOS: 1 days | Discharge: HOME | End: 2020-02-10
Payer: COMMERCIAL

## 2020-02-09 DIAGNOSIS — I10: ICD-10-CM

## 2020-02-09 DIAGNOSIS — Z79.52: ICD-10-CM

## 2020-02-09 DIAGNOSIS — F41.9: ICD-10-CM

## 2020-02-09 DIAGNOSIS — R06.02: ICD-10-CM

## 2020-02-09 DIAGNOSIS — Z79.899: ICD-10-CM

## 2020-02-09 DIAGNOSIS — E78.00: ICD-10-CM

## 2020-02-09 DIAGNOSIS — J44.9: ICD-10-CM

## 2020-02-09 DIAGNOSIS — M54.5: Primary | ICD-10-CM

## 2020-02-09 LAB
ALBUMIN SERPL BCG-MCNC: 3.9 G/DL (ref 3.5–5)
ALP SERPL-CCNC: 66 U/L (ref 40–110)
ALT SERPL W P-5'-P-CCNC: 21 U/L (ref 8–55)
ANION GAP SERPL CALC-SCNC: 12 MMOL/L (ref 10–20)
AST SERPL-CCNC: 17 U/L (ref 5–34)
BASOPHILS # BLD AUTO: 0.1 THOU/UL (ref 0–0.2)
BASOPHILS NFR BLD AUTO: 0.5 % (ref 0–1)
BILIRUB SERPL-MCNC: 0.3 MG/DL (ref 0.2–1.2)
BUN SERPL-MCNC: 21 MG/DL (ref 7–18.7)
CALCIUM SERPL-MCNC: 9.4 MG/DL (ref 7.8–10.44)
CHLORIDE SERPL-SCNC: 104 MMOL/L (ref 98–107)
CO2 SERPL-SCNC: 29 MMOL/L (ref 22–29)
CREAT CL PREDICTED SERPL C-G-VRATE: 0 ML/MIN (ref 70–130)
EOSINOPHIL # BLD AUTO: 0.1 THOU/UL (ref 0–0.7)
EOSINOPHIL NFR BLD AUTO: 0.3 % (ref 0–10)
GLOBULIN SER CALC-MCNC: 3.3 G/DL (ref 2.4–3.5)
GLUCOSE SERPL-MCNC: 81 MG/DL (ref 70–105)
HGB BLD-MCNC: 11.2 G/DL (ref 12–16)
LYMPHOCYTES # BLD: 3.1 THOU/UL (ref 1.2–3.4)
LYMPHOCYTES NFR BLD AUTO: 15.9 % (ref 21–51)
MCH RBC QN AUTO: 26.1 PG (ref 27–31)
MCV RBC AUTO: 83.6 FL (ref 78–98)
MONOCYTES # BLD AUTO: 2.2 THOU/UL (ref 0.11–0.59)
MONOCYTES NFR BLD AUTO: 11.4 % (ref 0–10)
NEUTROPHILS # BLD AUTO: 13.8 THOU/UL (ref 1.4–6.5)
NEUTROPHILS NFR BLD AUTO: 71.8 % (ref 42–75)
PLATELET # BLD AUTO: 227 THOU/UL (ref 130–400)
POTASSIUM SERPL-SCNC: 4.1 MMOL/L (ref 3.5–5.1)
RBC # BLD AUTO: 4.28 MILL/UL (ref 4.2–5.4)
SODIUM SERPL-SCNC: 141 MMOL/L (ref 136–145)
WBC # BLD AUTO: 19.2 THOU/UL (ref 4.8–10.8)

## 2020-02-09 PROCEDURE — 94760 N-INVAS EAR/PLS OXIMETRY 1: CPT

## 2020-02-09 PROCEDURE — 85025 COMPLETE CBC W/AUTO DIFF WBC: CPT

## 2020-02-09 PROCEDURE — 80053 COMPREHEN METABOLIC PANEL: CPT

## 2020-02-09 PROCEDURE — 93005 ELECTROCARDIOGRAM TRACING: CPT

## 2020-02-09 PROCEDURE — 83880 ASSAY OF NATRIURETIC PEPTIDE: CPT

## 2020-02-09 PROCEDURE — 71045 X-RAY EXAM CHEST 1 VIEW: CPT

## 2020-02-09 PROCEDURE — 84484 ASSAY OF TROPONIN QUANT: CPT

## 2020-02-09 PROCEDURE — 36415 COLL VENOUS BLD VENIPUNCTURE: CPT

## 2020-02-09 PROCEDURE — 72148 MRI LUMBAR SPINE W/O DYE: CPT

## 2020-02-09 NOTE — RAD
XR Chest 1 View Portable



HISTORY: Shortness of breath



COMPARISON: 1/23/2020



FINDINGS: The heart size is normal. The lungs are well expanded without focal areas of consolidation,
 pneumothorax or pleural effusions.



IMPRESSION: No radiographic evidence of acute cardiopulmonary process.



Reported By: Iron Ulloa 

Electronically Signed:  2/9/2020 11:18 PM

## 2020-02-10 NOTE — MRI
PRELIMINARY REPORT/[facility]/EMERGENCY AFTER HOURS PROCEDURE: 

 

EXAM: 

MR Lumbar Spine Without Intravenous Contrast. 

 

CLINICAL HISTORY: 

C/O SOB WITH LOWER BACK PAIN RADIATING DOWN LT LEG. DENIES INJURY. H/X SURGERY IN NOVEMBER. 

 

TECHNIQUE: 

Magnetic resonance images of the lumbar spine without intravenous contrast in multiple planes. 

 

CONTRAST: 

Without 

 

COMPARISON: 

None provided. 

 

FINDINGS: 

 

VERTEBRAE: 

No acute fracture or focal osseous lesion.  4 mm anterolisthesis of L4 on L5. Spinal fusion hardware 
consisting of transpedicular screws with vertical connecting rods bilaterally spanning the vertebral 
bodies of L2-L5.  Posterior laminectomy is at L2, L3 and L4. 

 

ALIGNMENT: Bony alignment is anatomic. 

 

SPINAL CORD AND CAUDA EQUINA: No abnormal signal or mass. 

 

DEGENERATIVE FINDINGS: L1-L2: Mild facet arthrosis and ligamentum hypertrophy.  No central canal or f
oraminal stenosis.  No significant testicular loss. 

 

L2-L3: No central canal or foraminal stenosis.  No significant height loss. 

 

L3-L4: No central canal or foraminal stenosis.  No significant disc height loss. 

 

L4-L5: Dark T2 and T1 signal in the region of the nerve foramina resulting in mild right and moderate
 to severe left neuroforaminal narrowing.  No spinal canal stenosis.  Disc desiccation. 

 

L5-S1: No central canal or foraminal stenosis. Unremarkable intervertebral disc. 

 

PARASPINAL SOFT TISSUES: There is a fluid collection within the laminectomy bed posterior to the vert
ebral body of L3 and L4 which measures 6.7 x 2.7 x 1.8 cm (CC, AP, transverse).  Signal characteristi
cs within this fluid collection are simple and follow CSF although there is no clear or definite comm
unication with the spinal canal.  The borders are within and there is no significant mass-effect on t
he spinal canal or adjacent soft tissues. 

 

IMPRESSION: 

1.  Postsurgical changes of laminectomy with posterior spinal fusion of L2-L5. 

2.  Dark signal within the neuroforamina of L4-L5 likely reflecting scarring/granulation tissue resul
ting in moderate to severe left neuroforaminal narrowing.  Correlate for radiculopathy. 

3.  Simple fluid collection within the laminectomy bed which is most compatible with a postoperative 
seroma.  Superimposed infection is not entirely excluded but considered much less likely.  If clinica
l concern persists, consider contrast enhanced MRI.

 

ELECTRONICALLY SIGNED BY:

Chris Hebert M.D.

Feb 10, 2020 1:08:45 AM CST

 

This report is intended for review by the ordering physician only, in accordance of law. If you recei
ve this report in error, please call [facility] at [phone].

 

FINAL REPORT

 

LUMBAR SPINE MRI WITHOUT CONTRAST:

 

Date:  02/09/2020

 

COMPARISON:  

08/29/2019. 

 

HISTORY:  

Shortness of breath. Low back pain radiating down left leg. Previous surgery. 

 

FINDINGS:

Limited evaluation due to extensive metallic susceptibility artifact from bilateral transpedicular sc
rews at L2, L3, L4, and L5. Persistent Grade I anterolisthesis of L4 upon L5 (5 mm). Interval posteri
or laminectomy defect at L2-L3, L3-L4, and L4-L5. There is a large fluid collection at the operative 
site. Findings may represent postsurgical change. However, an infected fluid collection cannot be exc
luded. Clinical correlation is essential. 

 

No evidence of high grade central canal stenosis throughout the lumbar spine. Limited evaluation of t
he neural foramina due to metallic susceptibility artifact. Stable bilateral foraminal narrowing when
 compared to the previous examination at L4-L5. 

 

IMPRESSION: 

1.  Extensive postoperative changes with a T2 hyperintense fluid collection at the operative site. Po
stoperative changes are suspected. However, an infected fluid collection cannot be excluded. 

 

2.  No significant central canal stenosis throughout the lumbar spine. 

 

3.  Stable bilateral neural foraminal narrowing at L4-L5. 

 

This report is in agreement with the initial report by [facility]. 

 

Postcontrast imaging and general surgical consultation would be beneficial if clinically warranted. 

 

 

POS: [facility]

## 2020-02-18 ENCOUNTER — HOSPITAL ENCOUNTER (EMERGENCY)
Dept: HOSPITAL 92 - ERS | Age: 50
Discharge: HOME | End: 2020-02-18
Payer: COMMERCIAL

## 2020-02-18 DIAGNOSIS — E78.00: ICD-10-CM

## 2020-02-18 DIAGNOSIS — Z79.899: ICD-10-CM

## 2020-02-18 DIAGNOSIS — Z87.891: ICD-10-CM

## 2020-02-18 DIAGNOSIS — F41.9: ICD-10-CM

## 2020-02-18 DIAGNOSIS — Z79.891: ICD-10-CM

## 2020-02-18 DIAGNOSIS — I25.2: ICD-10-CM

## 2020-02-18 DIAGNOSIS — J44.9: ICD-10-CM

## 2020-02-18 DIAGNOSIS — I10: ICD-10-CM

## 2020-02-18 DIAGNOSIS — R60.0: Primary | ICD-10-CM

## 2020-02-18 DIAGNOSIS — I25.10: ICD-10-CM

## 2020-02-18 LAB
ALBUMIN SERPL BCG-MCNC: 3.5 G/DL (ref 3.5–5)
ALP SERPL-CCNC: 70 U/L (ref 40–110)
ALT SERPL W P-5'-P-CCNC: 19 U/L (ref 8–55)
ANION GAP SERPL CALC-SCNC: 13 MMOL/L (ref 10–20)
AST SERPL-CCNC: 15 U/L (ref 5–34)
BASOPHILS # BLD AUTO: 0 THOU/UL (ref 0–0.2)
BASOPHILS NFR BLD AUTO: 0.3 % (ref 0–1)
BILIRUB SERPL-MCNC: 0.3 MG/DL (ref 0.2–1.2)
BUN SERPL-MCNC: 14 MG/DL (ref 7–18.7)
CALCIUM SERPL-MCNC: 9 MG/DL (ref 7.8–10.44)
CHLORIDE SERPL-SCNC: 105 MMOL/L (ref 98–107)
CO2 SERPL-SCNC: 26 MMOL/L (ref 22–29)
CREAT CL PREDICTED SERPL C-G-VRATE: 0 ML/MIN (ref 70–130)
EOSINOPHIL # BLD AUTO: 1.2 THOU/UL (ref 0–0.7)
EOSINOPHIL NFR BLD AUTO: 8.6 % (ref 0–10)
GLOBULIN SER CALC-MCNC: 3.5 G/DL (ref 2.4–3.5)
GLUCOSE SERPL-MCNC: 96 MG/DL (ref 70–105)
HGB BLD-MCNC: 11.5 G/DL (ref 12–16)
LYMPHOCYTES # BLD: 2.6 THOU/UL (ref 1.2–3.4)
LYMPHOCYTES NFR BLD AUTO: 19 % (ref 21–51)
MCH RBC QN AUTO: 26.4 PG (ref 27–31)
MCV RBC AUTO: 83 FL (ref 78–98)
MONOCYTES # BLD AUTO: 1.3 THOU/UL (ref 0.11–0.59)
MONOCYTES NFR BLD AUTO: 9.8 % (ref 0–10)
NEUTROPHILS # BLD AUTO: 8.4 THOU/UL (ref 1.4–6.5)
NEUTROPHILS NFR BLD AUTO: 62.2 % (ref 42–75)
PLATELET # BLD AUTO: 400 THOU/UL (ref 130–400)
POTASSIUM SERPL-SCNC: 3.7 MMOL/L (ref 3.5–5.1)
RBC # BLD AUTO: 4.37 MILL/UL (ref 4.2–5.4)
SODIUM SERPL-SCNC: 140 MMOL/L (ref 136–145)
TROPONIN I SERPL DL<=0.01 NG/ML-MCNC: (no result) NG/ML (ref ?–0.03)
WBC # BLD AUTO: 13.4 THOU/UL (ref 4.8–10.8)

## 2020-02-18 PROCEDURE — 94644 CONT INHLJ TX 1ST HOUR: CPT

## 2020-02-18 PROCEDURE — 96375 TX/PRO/DX INJ NEW DRUG ADDON: CPT

## 2020-02-18 PROCEDURE — 83880 ASSAY OF NATRIURETIC PEPTIDE: CPT

## 2020-02-18 PROCEDURE — 93005 ELECTROCARDIOGRAM TRACING: CPT

## 2020-02-18 PROCEDURE — 84484 ASSAY OF TROPONIN QUANT: CPT

## 2020-02-18 PROCEDURE — 36415 COLL VENOUS BLD VENIPUNCTURE: CPT

## 2020-02-18 PROCEDURE — 71045 X-RAY EXAM CHEST 1 VIEW: CPT

## 2020-02-18 PROCEDURE — 80053 COMPREHEN METABOLIC PANEL: CPT

## 2020-02-18 PROCEDURE — 85025 COMPLETE CBC W/AUTO DIFF WBC: CPT

## 2020-02-18 PROCEDURE — 96365 THER/PROPH/DIAG IV INF INIT: CPT

## 2020-02-18 NOTE — RAD
CHEST 1 VIEW PORTABLE:

 

Date:  02/18/2020

 

HISTORY:  

Shortness of breath. 

 

COMPARISON:  

02/09/2020. 

 

FINDINGS:

Anterior cervical fusion changes of the cervicothoracic junction. Heart size is within normal limits.
 The lungs are clear of acute process. 

 

IMPRESSION: 

No significant acute intrathoracic disease. No evidence for pneumonia. Stable from prior study. 

 

 

POS: [facility]

## 2022-02-15 ENCOUNTER — HOSPITAL ENCOUNTER (INPATIENT)
Dept: HOSPITAL 92 - CSHERS | Age: 52
LOS: 4 days | Discharge: HOME | DRG: 190 | End: 2022-02-19
Attending: STUDENT IN AN ORGANIZED HEALTH CARE EDUCATION/TRAINING PROGRAM | Admitting: INTERNAL MEDICINE
Payer: COMMERCIAL

## 2022-02-15 VITALS — BODY MASS INDEX: 26.6 KG/M2

## 2022-02-15 DIAGNOSIS — Z20.822: ICD-10-CM

## 2022-02-15 DIAGNOSIS — I25.10: ICD-10-CM

## 2022-02-15 DIAGNOSIS — I11.0: ICD-10-CM

## 2022-02-15 DIAGNOSIS — T81.89XA: ICD-10-CM

## 2022-02-15 DIAGNOSIS — Z90.49: ICD-10-CM

## 2022-02-15 DIAGNOSIS — I50.32: ICD-10-CM

## 2022-02-15 DIAGNOSIS — Z88.0: ICD-10-CM

## 2022-02-15 DIAGNOSIS — M79.7: ICD-10-CM

## 2022-02-15 DIAGNOSIS — F32.A: ICD-10-CM

## 2022-02-15 DIAGNOSIS — J44.1: Primary | ICD-10-CM

## 2022-02-15 DIAGNOSIS — G47.33: ICD-10-CM

## 2022-02-15 DIAGNOSIS — Z79.899: ICD-10-CM

## 2022-02-15 DIAGNOSIS — I25.2: ICD-10-CM

## 2022-02-15 DIAGNOSIS — J38.3: ICD-10-CM

## 2022-02-15 DIAGNOSIS — R65.10: ICD-10-CM

## 2022-02-15 DIAGNOSIS — Z88.1: ICD-10-CM

## 2022-02-15 DIAGNOSIS — Z90.710: ICD-10-CM

## 2022-02-15 DIAGNOSIS — G25.81: ICD-10-CM
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
## 2022-02-15 DIAGNOSIS — Z87.891: ICD-10-CM

## 2022-02-15 DIAGNOSIS — J96.21: ICD-10-CM

## 2022-02-15 DIAGNOSIS — G89.4: ICD-10-CM

## 2022-02-15 DIAGNOSIS — Z90.89: ICD-10-CM

## 2022-02-15 DIAGNOSIS — Z88.8: ICD-10-CM

## 2022-02-15 DIAGNOSIS — F41.9: ICD-10-CM

## 2022-02-15 LAB
ALBUMIN SERPL BCG-MCNC: 3.8 G/DL (ref 3.5–5)
ALP SERPL-CCNC: 73 U/L (ref 40–110)
ALT SERPL W P-5'-P-CCNC: 19 U/L (ref 8–55)
ANION GAP SERPL CALC-SCNC: 16 MMOL/L (ref 10–20)
AST SERPL-CCNC: 18 U/L (ref 5–34)
BASOPHILS # BLD AUTO: 0.1 10X3/UL (ref 0–0.2)
BASOPHILS NFR BLD AUTO: 0.4 % (ref 0–2)
BILIRUB SERPL-MCNC: 0.3 MG/DL (ref 0.2–1.2)
BUN SERPL-MCNC: 15 MG/DL (ref 9.8–20.1)
CALCIUM SERPL-MCNC: 9.2 MG/DL (ref 7.8–10.44)
CHLORIDE SERPL-SCNC: 104 MMOL/L (ref 98–107)
CO2 SERPL-SCNC: 24 MMOL/L (ref 22–29)
CREAT CL PREDICTED SERPL C-G-VRATE: 0 ML/MIN (ref 70–130)
EOSINOPHIL # BLD AUTO: 0.2 10X3/UL (ref 0–0.5)
EOSINOPHIL NFR BLD AUTO: 0.9 % (ref 0–6)
GLOBULIN SER CALC-MCNC: 3.5 G/DL (ref 2.4–3.5)
GLUCOSE SERPL-MCNC: 98 MG/DL (ref 70–105)
HGB BLD-MCNC: 12.2 G/DL (ref 12–15.5)
LIPASE SERPL-CCNC: 44 U/L (ref 8–78)
LYMPHOCYTES NFR BLD AUTO: 7.3 % (ref 18–47)
MAGNESIUM SERPL-MCNC: 1.9 MG/DL (ref 1.6–2.6)
MCH RBC QN AUTO: 25.7 PG (ref 27–33)
MCV RBC AUTO: 85.9 FL (ref 81.6–98.3)
MONOCYTES # BLD AUTO: 0.3 10X3/UL (ref 0–1.1)
MONOCYTES NFR BLD AUTO: 2 % (ref 0–10)
NEUTROPHILS # BLD AUTO: 14.4 10X3/UL (ref 1.5–8.4)
NEUTROPHILS NFR BLD AUTO: 88.9 % (ref 40–75)
PLATELET # BLD AUTO: 275 10X3/UL (ref 150–450)
POTASSIUM SERPL-SCNC: 4.3 MMOL/L (ref 3.5–5.1)
RBC # BLD AUTO: 4.74 10X6/UL (ref 3.9–5.03)
SODIUM SERPL-SCNC: 140 MMOL/L (ref 136–145)
SP GR UR STRIP: 1.01 (ref 1–1.04)
WBC # BLD AUTO: 16.2 10X3/UL (ref 3.5–10.5)

## 2022-02-15 PROCEDURE — 83690 ASSAY OF LIPASE: CPT

## 2022-02-15 PROCEDURE — 80053 COMPREHEN METABOLIC PANEL: CPT

## 2022-02-15 PROCEDURE — 0241U: CPT

## 2022-02-15 PROCEDURE — 81003 URINALYSIS AUTO W/O SCOPE: CPT

## 2022-02-15 PROCEDURE — 94640 AIRWAY INHALATION TREATMENT: CPT

## 2022-02-15 PROCEDURE — 74177 CT ABD & PELVIS W/CONTRAST: CPT

## 2022-02-15 PROCEDURE — 85025 COMPLETE CBC W/AUTO DIFF WBC: CPT

## 2022-02-15 PROCEDURE — 83605 ASSAY OF LACTIC ACID: CPT

## 2022-02-15 PROCEDURE — 84484 ASSAY OF TROPONIN QUANT: CPT

## 2022-02-15 PROCEDURE — 94760 N-INVAS EAR/PLS OXIMETRY 1: CPT

## 2022-02-15 PROCEDURE — 83880 ASSAY OF NATRIURETIC PEPTIDE: CPT

## 2022-02-15 PROCEDURE — 87086 URINE CULTURE/COLONY COUNT: CPT

## 2022-02-15 PROCEDURE — 80048 BASIC METABOLIC PNL TOTAL CA: CPT

## 2022-02-15 PROCEDURE — 94664 DEMO&/EVAL PT USE INHALER: CPT

## 2022-02-15 PROCEDURE — 36415 COLL VENOUS BLD VENIPUNCTURE: CPT

## 2022-02-15 PROCEDURE — 96375 TX/PRO/DX INJ NEW DRUG ADDON: CPT

## 2022-02-15 PROCEDURE — 93010 ELECTROCARDIOGRAM REPORT: CPT

## 2022-02-15 PROCEDURE — 87040 BLOOD CULTURE FOR BACTERIA: CPT

## 2022-02-15 PROCEDURE — 71045 X-RAY EXAM CHEST 1 VIEW: CPT

## 2022-02-15 PROCEDURE — 83735 ASSAY OF MAGNESIUM: CPT

## 2022-02-15 PROCEDURE — 93005 ELECTROCARDIOGRAM TRACING: CPT

## 2022-02-15 PROCEDURE — 96367 TX/PROPH/DG ADDL SEQ IV INF: CPT

## 2022-02-15 PROCEDURE — 96365 THER/PROPH/DIAG IV INF INIT: CPT

## 2022-02-16 LAB
ANION GAP SERPL CALC-SCNC: 16 MMOL/L (ref 10–20)
ANISOCYTOSIS BLD QL SMEAR: (no result) (100X)
BASOPHILS # BLD AUTO: 0 10X3/UL (ref 0–0.2)
BASOPHILS NFR BLD AUTO: 0.2 % (ref 0–2)
BUN SERPL-MCNC: 18 MG/DL (ref 9.8–20.1)
CALCIUM SERPL-MCNC: 8.9 MG/DL (ref 7.8–10.44)
CHLORIDE SERPL-SCNC: 103 MMOL/L (ref 98–107)
CO2 SERPL-SCNC: 26 MMOL/L (ref 22–29)
CREAT CL PREDICTED SERPL C-G-VRATE: 108 ML/MIN (ref 70–130)
EOSINOPHIL # BLD AUTO: 0 10X3/UL (ref 0–0.5)
EOSINOPHIL NFR BLD AUTO: 0 % (ref 0–6)
GLUCOSE SERPL-MCNC: 134 MG/DL (ref 70–105)
HGB BLD-MCNC: 11.7 G/DL (ref 12–15.5)
LYMPHOCYTES NFR BLD AUTO: 7.4 % (ref 18–47)
MACROCYTES BLD QL SMEAR: (no result) (100X)
MCH RBC QN AUTO: 25.6 PG (ref 27–33)
MCV RBC AUTO: 85.6 FL (ref 81.6–98.3)
MICROCYTES BLD QL SMEAR: (no result) (100X)
MONOCYTES # BLD AUTO: 0.4 10X3/UL (ref 0–1.1)
MONOCYTES NFR BLD AUTO: 2.7 % (ref 0–10)
NEUTROPHILS # BLD AUTO: 12.4 10X3/UL (ref 1.5–8.4)
NEUTROPHILS NFR BLD AUTO: 89.1 % (ref 40–75)
PLATELET # BLD AUTO: 301 10X3/UL (ref 150–450)
POTASSIUM SERPL-SCNC: 3.9 MMOL/L (ref 3.5–5.1)
RBC # BLD AUTO: 4.57 10X6/UL (ref 3.9–5.03)
SODIUM SERPL-SCNC: 141 MMOL/L (ref 136–145)
WBC # BLD AUTO: 13.9 10X3/UL (ref 3.5–10.5)

## 2022-02-16 RX ADMIN — GUAIFENESIN AND DEXTROMETHORPHAN SCH ML: 100; 10 SYRUP ORAL at 01:13

## 2022-02-16 RX ADMIN — GUAIFENESIN AND DEXTROMETHORPHAN SCH ML: 100; 10 SYRUP ORAL at 06:46

## 2022-02-16 RX ADMIN — GUAIFENESIN AND DEXTROMETHORPHAN SCH ML: 100; 10 SYRUP ORAL at 09:35

## 2022-02-16 RX ADMIN — GUAIFENESIN AND DEXTROMETHORPHAN SCH ML: 100; 10 SYRUP ORAL at 17:00

## 2022-02-16 RX ADMIN — GUAIFENESIN AND DEXTROMETHORPHAN SCH ML: 100; 10 SYRUP ORAL at 21:39

## 2022-02-16 RX ADMIN — MOMETASONE FUROATE AND FORMOTEROL FUMARATE DIHYDRATE SCH: 200; 5 AEROSOL RESPIRATORY (INHALATION) at 19:23

## 2022-02-16 RX ADMIN — ALUMINUM ZIRCONIUM TRICHLOROHYDREX GLY SCH EACH: 0.2 STICK TOPICAL at 21:46

## 2022-02-16 RX ADMIN — MOMETASONE FUROATE AND FORMOTEROL FUMARATE DIHYDRATE SCH: 200; 5 AEROSOL RESPIRATORY (INHALATION) at 10:23

## 2022-02-16 RX ADMIN — GUAIFENESIN AND DEXTROMETHORPHAN SCH ML: 100; 10 SYRUP ORAL at 14:36

## 2022-02-17 LAB
ANION GAP SERPL CALC-SCNC: 15 MMOL/L (ref 10–20)
BUN SERPL-MCNC: 20 MG/DL (ref 9.8–20.1)
CALCIUM SERPL-MCNC: 8.6 MG/DL (ref 7.8–10.44)
CHLORIDE SERPL-SCNC: 103 MMOL/L (ref 98–107)
CO2 SERPL-SCNC: 25 MMOL/L (ref 22–29)
CREAT CL PREDICTED SERPL C-G-VRATE: 107 ML/MIN (ref 70–130)
GLUCOSE SERPL-MCNC: 196 MG/DL (ref 70–105)
HGB BLD-MCNC: 11.2 G/DL (ref 12–15.5)
MCH RBC QN AUTO: 26.1 PG (ref 27–33)
MCV RBC AUTO: 87.9 FL (ref 81.6–98.3)
MDIFF COMPLETE?: YES
PLATELET # BLD AUTO: 269 10X3/UL (ref 150–450)
POTASSIUM SERPL-SCNC: 4.1 MMOL/L (ref 3.5–5.1)
RBC # BLD AUTO: 4.29 10X6/UL (ref 3.9–5.03)
SODIUM SERPL-SCNC: 139 MMOL/L (ref 136–145)
WBC # BLD AUTO: 20.2 10X3/UL (ref 3.5–10.5)

## 2022-02-17 RX ADMIN — GUAIFENESIN AND DEXTROMETHORPHAN SCH ML: 100; 10 SYRUP ORAL at 01:20

## 2022-02-17 RX ADMIN — MOMETASONE FUROATE AND FORMOTEROL FUMARATE DIHYDRATE SCH: 200; 5 AEROSOL RESPIRATORY (INHALATION) at 07:43

## 2022-02-17 RX ADMIN — GUAIFENESIN AND DEXTROMETHORPHAN SCH ML: 100; 10 SYRUP ORAL at 16:05

## 2022-02-17 RX ADMIN — GUAIFENESIN AND DEXTROMETHORPHAN SCH ML: 100; 10 SYRUP ORAL at 13:47

## 2022-02-17 RX ADMIN — MOMETASONE FUROATE AND FORMOTEROL FUMARATE DIHYDRATE SCH: 200; 5 AEROSOL RESPIRATORY (INHALATION) at 18:48

## 2022-02-17 RX ADMIN — ALUMINUM ZIRCONIUM TRICHLOROHYDREX GLY SCH EACH: 0.2 STICK TOPICAL at 21:33

## 2022-02-17 RX ADMIN — GUAIFENESIN AND DEXTROMETHORPHAN SCH ML: 100; 10 SYRUP ORAL at 21:32

## 2022-02-17 RX ADMIN — GUAIFENESIN AND DEXTROMETHORPHAN SCH ML: 100; 10 SYRUP ORAL at 05:36

## 2022-02-17 RX ADMIN — GUAIFENESIN AND DEXTROMETHORPHAN SCH ML: 100; 10 SYRUP ORAL at 09:05

## 2022-02-17 RX ADMIN — ALUMINUM ZIRCONIUM TRICHLOROHYDREX GLY SCH EACH: 0.2 STICK TOPICAL at 09:06

## 2022-02-18 LAB
ANION GAP SERPL CALC-SCNC: 13 MMOL/L (ref 10–20)
BASOPHILS # BLD AUTO: 0 10X3/UL (ref 0–0.2)
BASOPHILS NFR BLD AUTO: 0.1 % (ref 0–2)
BUN SERPL-MCNC: 24 MG/DL (ref 9.8–20.1)
CALCIUM SERPL-MCNC: 9 MG/DL (ref 7.8–10.44)
CHLORIDE SERPL-SCNC: 101 MMOL/L (ref 98–107)
CO2 SERPL-SCNC: 30 MMOL/L (ref 22–29)
CREAT CL PREDICTED SERPL C-G-VRATE: 111 ML/MIN (ref 70–130)
EOSINOPHIL # BLD AUTO: 0 10X3/UL (ref 0–0.5)
EOSINOPHIL NFR BLD AUTO: 0 % (ref 0–6)
GLUCOSE SERPL-MCNC: 140 MG/DL (ref 70–105)
HGB BLD-MCNC: 11 G/DL (ref 12–15.5)
LYMPHOCYTES NFR BLD AUTO: 4.5 % (ref 18–47)
MCH RBC QN AUTO: 26 PG (ref 27–33)
MCV RBC AUTO: 85.3 FL (ref 81.6–98.3)
MONOCYTES # BLD AUTO: 0.8 10X3/UL (ref 0–1.1)
MONOCYTES NFR BLD AUTO: 5 % (ref 0–10)
NEUTROPHILS # BLD AUTO: 14.7 10X3/UL (ref 1.5–8.4)
NEUTROPHILS NFR BLD AUTO: 89.6 % (ref 40–75)
PLATELET # BLD AUTO: 271 10X3/UL (ref 150–450)
POTASSIUM SERPL-SCNC: 4.4 MMOL/L (ref 3.5–5.1)
RBC # BLD AUTO: 4.23 10X6/UL (ref 3.9–5.03)
SODIUM SERPL-SCNC: 140 MMOL/L (ref 136–145)
WBC # BLD AUTO: 16.4 10X3/UL (ref 3.5–10.5)

## 2022-02-18 RX ADMIN — GUAIFENESIN AND DEXTROMETHORPHAN SCH ML: 100; 10 SYRUP ORAL at 21:20

## 2022-02-18 RX ADMIN — ALUMINUM ZIRCONIUM TRICHLOROHYDREX GLY SCH EACH: 0.2 STICK TOPICAL at 08:42

## 2022-02-18 RX ADMIN — MOMETASONE FUROATE AND FORMOTEROL FUMARATE DIHYDRATE SCH: 200; 5 AEROSOL RESPIRATORY (INHALATION) at 19:30

## 2022-02-18 RX ADMIN — GUAIFENESIN AND DEXTROMETHORPHAN SCH ML: 100; 10 SYRUP ORAL at 17:26

## 2022-02-18 RX ADMIN — MOMETASONE FUROATE AND FORMOTEROL FUMARATE DIHYDRATE SCH: 200; 5 AEROSOL RESPIRATORY (INHALATION) at 08:32

## 2022-02-18 RX ADMIN — GUAIFENESIN AND DEXTROMETHORPHAN SCH ML: 100; 10 SYRUP ORAL at 06:09

## 2022-02-18 RX ADMIN — GUAIFENESIN AND DEXTROMETHORPHAN SCH: 100; 10 SYRUP ORAL at 02:12

## 2022-02-18 RX ADMIN — GUAIFENESIN AND DEXTROMETHORPHAN SCH ML: 100; 10 SYRUP ORAL at 08:40

## 2022-02-18 RX ADMIN — GUAIFENESIN AND DEXTROMETHORPHAN SCH ML: 100; 10 SYRUP ORAL at 14:35

## 2022-02-19 VITALS — DIASTOLIC BLOOD PRESSURE: 71 MMHG | SYSTOLIC BLOOD PRESSURE: 153 MMHG

## 2022-02-19 VITALS — TEMPERATURE: 98.1 F

## 2022-02-19 RX ADMIN — MOMETASONE FUROATE AND FORMOTEROL FUMARATE DIHYDRATE SCH: 200; 5 AEROSOL RESPIRATORY (INHALATION) at 07:03

## 2022-02-19 RX ADMIN — GUAIFENESIN AND DEXTROMETHORPHAN SCH: 100; 10 SYRUP ORAL at 03:05

## 2022-02-19 RX ADMIN — GUAIFENESIN AND DEXTROMETHORPHAN SCH ML: 100; 10 SYRUP ORAL at 12:27

## 2022-02-19 RX ADMIN — GUAIFENESIN AND DEXTROMETHORPHAN SCH ML: 100; 10 SYRUP ORAL at 09:12

## 2022-02-19 RX ADMIN — GUAIFENESIN AND DEXTROMETHORPHAN SCH ML: 100; 10 SYRUP ORAL at 06:02
